# Patient Record
Sex: FEMALE | Race: BLACK OR AFRICAN AMERICAN | NOT HISPANIC OR LATINO | URBAN - METROPOLITAN AREA
[De-identification: names, ages, dates, MRNs, and addresses within clinical notes are randomized per-mention and may not be internally consistent; named-entity substitution may affect disease eponyms.]

---

## 2020-11-09 ENCOUNTER — INPATIENT (INPATIENT)
Facility: HOSPITAL | Age: 50
LOS: 2 days | Discharge: HOME | DRG: 812 | End: 2020-11-12
Attending: HOSPITALIST | Admitting: HOSPITALIST
Payer: MEDICARE

## 2020-11-09 VITALS
RESPIRATION RATE: 18 BRPM | SYSTOLIC BLOOD PRESSURE: 133 MMHG | HEART RATE: 87 BPM | HEIGHT: 60 IN | OXYGEN SATURATION: 100 % | DIASTOLIC BLOOD PRESSURE: 84 MMHG | TEMPERATURE: 98 F | WEIGHT: 123.9 LBS

## 2020-11-09 DIAGNOSIS — R63.8 OTHER SYMPTOMS AND SIGNS CONCERNING FOOD AND FLUID INTAKE: ICD-10-CM

## 2020-11-09 DIAGNOSIS — D64.9 ANEMIA, UNSPECIFIED: ICD-10-CM

## 2020-11-09 DIAGNOSIS — R79.89 OTHER SPECIFIED ABNORMAL FINDINGS OF BLOOD CHEMISTRY: ICD-10-CM

## 2020-11-09 DIAGNOSIS — Z98.891 HISTORY OF UTERINE SCAR FROM PREVIOUS SURGERY: Chronic | ICD-10-CM

## 2020-11-09 LAB
APTT BLD: 34.7 SEC — SIGNIFICANT CHANGE UP (ref 27.5–35.5)
BASOPHILS # BLD AUTO: 0.04 K/UL — SIGNIFICANT CHANGE UP (ref 0–0.2)
BASOPHILS NFR BLD AUTO: 0.7 % — SIGNIFICANT CHANGE UP (ref 0–2)
BILIRUB DIRECT SERPL-MCNC: 1 MG/DL — HIGH (ref 0–0.2)
BILIRUB INDIRECT FLD-MCNC: 0.6 MG/DL — SIGNIFICANT CHANGE UP (ref 0.2–1)
BILIRUB SERPL-MCNC: 1.7 MG/DL — HIGH (ref 0.2–1.2)
BLD GP AB SCN SERPL QL: NEGATIVE — SIGNIFICANT CHANGE UP
CK MB CFR SERPL CALC: <1 NG/ML — SIGNIFICANT CHANGE UP (ref 0–6.7)
CK SERPL-CCNC: 43 U/L — SIGNIFICANT CHANGE UP (ref 25–170)
EOSINOPHIL # BLD AUTO: 0.26 K/UL — SIGNIFICANT CHANGE UP (ref 0–0.5)
EOSINOPHIL NFR BLD AUTO: 4.5 % — SIGNIFICANT CHANGE UP (ref 0–6)
FERRITIN SERPL-MCNC: 4 NG/ML — LOW (ref 15–150)
HCT VFR BLD CALC: 23.2 % — LOW (ref 34.5–45)
HGB BLD-MCNC: 6 G/DL — CRITICAL LOW (ref 11.5–15.5)
IMM GRANULOCYTES NFR BLD AUTO: 0.3 % — SIGNIFICANT CHANGE UP (ref 0–1.5)
INR BLD: 1.02 — SIGNIFICANT CHANGE UP (ref 0.88–1.16)
IRON SATN MFR SERPL: 11 UG/DL — LOW (ref 30–160)
IRON SATN MFR SERPL: 2 % — LOW (ref 14–50)
LYMPHOCYTES # BLD AUTO: 1.34 K/UL — SIGNIFICANT CHANGE UP (ref 1–3.3)
LYMPHOCYTES # BLD AUTO: 23.3 % — SIGNIFICANT CHANGE UP (ref 13–44)
MCHC RBC-ENTMCNC: 16.7 PG — LOW (ref 27–34)
MCHC RBC-ENTMCNC: 25.9 GM/DL — LOW (ref 32–36)
MCV RBC AUTO: 64.6 FL — LOW (ref 80–100)
MONOCYTES # BLD AUTO: 0.93 K/UL — HIGH (ref 0–0.9)
MONOCYTES NFR BLD AUTO: 16.2 % — HIGH (ref 2–14)
NEUTROPHILS # BLD AUTO: 3.16 K/UL — SIGNIFICANT CHANGE UP (ref 1.8–7.4)
NEUTROPHILS NFR BLD AUTO: 55 % — SIGNIFICANT CHANGE UP (ref 43–77)
NRBC # BLD: 0 /100 WBCS — SIGNIFICANT CHANGE UP (ref 0–0)
OB PNL STL: NEGATIVE — SIGNIFICANT CHANGE UP
PLATELET # BLD AUTO: 303 K/UL — SIGNIFICANT CHANGE UP (ref 150–400)
PROTHROM AB SERPL-ACNC: 12.2 SEC — SIGNIFICANT CHANGE UP (ref 10.6–13.6)
RBC # BLD: 3.59 M/UL — LOW (ref 3.8–5.2)
RBC # FLD: 19.7 % — HIGH (ref 10.3–14.5)
RH IG SCN BLD-IMP: POSITIVE — SIGNIFICANT CHANGE UP
RH IG SCN BLD-IMP: POSITIVE — SIGNIFICANT CHANGE UP
SARS-COV-2 RNA SPEC QL NAA+PROBE: SIGNIFICANT CHANGE UP
TIBC SERPL-MCNC: 477 UG/DL — HIGH (ref 220–430)
TROPONIN T SERPL-MCNC: <0.01 NG/ML — SIGNIFICANT CHANGE UP (ref 0–0.01)
UIBC SERPL-MCNC: 466 UG/DL — HIGH (ref 110–370)
WBC # BLD: 5.75 K/UL — SIGNIFICANT CHANGE UP (ref 3.8–10.5)
WBC # FLD AUTO: 5.75 K/UL — SIGNIFICANT CHANGE UP (ref 3.8–10.5)

## 2020-11-09 PROCEDURE — 74177 CT ABD & PELVIS W/CONTRAST: CPT | Mod: 26

## 2020-11-09 PROCEDURE — 99223 1ST HOSP IP/OBS HIGH 75: CPT | Mod: GC

## 2020-11-09 PROCEDURE — 99285 EMERGENCY DEPT VISIT HI MDM: CPT

## 2020-11-09 PROCEDURE — 71045 X-RAY EXAM CHEST 1 VIEW: CPT | Mod: 26

## 2020-11-09 RX ORDER — FERROUS GLUCONATE 100 %
1 POWDER (GRAM) MISCELLANEOUS
Qty: 0 | Refills: 0 | DISCHARGE

## 2020-11-09 RX ORDER — INFLUENZA VIRUS VACCINE 15; 15; 15; 15 UG/.5ML; UG/.5ML; UG/.5ML; UG/.5ML
0.5 SUSPENSION INTRAMUSCULAR ONCE
Refills: 0 | Status: DISCONTINUED | OUTPATIENT
Start: 2020-11-09 | End: 2020-11-12

## 2020-11-09 RX ORDER — IRON SUCROSE 20 MG/ML
200 INJECTION, SOLUTION INTRAVENOUS EVERY 24 HOURS
Refills: 0 | Status: DISCONTINUED | OUTPATIENT
Start: 2020-11-09 | End: 2020-11-10

## 2020-11-09 RX ORDER — FAMOTIDINE 10 MG/ML
1 INJECTION INTRAVENOUS
Qty: 0 | Refills: 0 | DISCHARGE

## 2020-11-09 RX ORDER — IPRATROPIUM/ALBUTEROL SULFATE 18-103MCG
3 AEROSOL WITH ADAPTER (GRAM) INHALATION
Qty: 0 | Refills: 0 | DISCHARGE

## 2020-11-09 RX ORDER — PANTOPRAZOLE SODIUM 20 MG/1
40 TABLET, DELAYED RELEASE ORAL EVERY 12 HOURS
Refills: 0 | Status: DISCONTINUED | OUTPATIENT
Start: 2020-11-09 | End: 2020-11-12

## 2020-11-09 RX ORDER — FAMOTIDINE 10 MG/ML
20 INJECTION INTRAVENOUS
Refills: 0 | Status: DISCONTINUED | OUTPATIENT
Start: 2020-11-09 | End: 2020-11-09

## 2020-11-09 RX ORDER — ALBUTEROL 90 UG/1
2 AEROSOL, METERED ORAL
Qty: 0 | Refills: 0 | DISCHARGE

## 2020-11-09 RX ORDER — ALBUTEROL 90 UG/1
2 AEROSOL, METERED ORAL EVERY 6 HOURS
Refills: 0 | Status: DISCONTINUED | OUTPATIENT
Start: 2020-11-09 | End: 2020-11-12

## 2020-11-09 RX ADMIN — PANTOPRAZOLE SODIUM 40 MILLIGRAM(S): 20 TABLET, DELAYED RELEASE ORAL at 18:20

## 2020-11-09 RX ADMIN — IRON SUCROSE 110 MILLIGRAM(S): 20 INJECTION, SOLUTION INTRAVENOUS at 19:40

## 2020-11-09 NOTE — H&P ADULT - NSHPSOCIALHISTORY_GEN_ALL_CORE
formerly would smoke, smoked about a pack every few days since she was 18  formerly would drink a few beers and several shots a day for many years, now only a social drinker  formerly was a frequent user of cocaine, has not used In 2 years

## 2020-11-09 NOTE — H&P ADULT - NSHPPHYSICALEXAM_GEN_ALL_CORE
.  VITAL SIGNS:  T(C): 36.6 (11-09-20 @ 09:48), Max: 36.8 (11-09-20 @ 08:44)  T(F): 97.9 (11-09-20 @ 09:48), Max: 98.2 (11-09-20 @ 08:44)  HR: 94 (11-09-20 @ 10:54) (75 - 94)  BP: 147/79 (11-09-20 @ 10:54) (114/67 - 147/79)  BP(mean): --  RR: 16 (11-09-20 @ 09:48) (16 - 18)  SpO2: 96% (11-09-20 @ 09:48) (96% - 100%)  Wt(kg): --    PHYSICAL EXAM:    Constitutional: WDWN resting comfortably in bed; NAD  Head: NC/AT  Eyes: anicteric sclera  ENT: no nasal discharge; MMM  Neck: supple; no JVD or thyromegaly  Respiratory: CTA B/L; no W/R/R, no retractions  Cardiac: +S1/S2; RRR; no M/R/G;  Gastrointestinal: soft, NT/ND; no rebound or guarding;  Dermatologic: skin warm, dry and intact; no rashes, wounds, or scars  Neurologic: AAOx3; CNII-XII grossly intact; no focal deficits  Psychiatric: affect and characteristics of appearance, verbalizations, behaviors are appropriate

## 2020-11-09 NOTE — H&P ADULT - ATTENDING COMMENTS
50 year old woman with history of sarcoidosis with liver involvement (diagnosed in 2009, “eyes were yellow at that time”, “my liver tests were high”, treated with steroids), history of GI bleed (last EGD circa 2018, which showed gastritis and gastric ulcers, per patient’s recollection), iron deficiency anemia of unknown etiology (5 years since last period, “I was needing IV iron every other week),   Gets most of her outpatient care at [facility] (Gastroenterologist, hematologist)  Sent here from hematologist’s office for Hgb 6.0 with symptoms of dyspnea.     # Symptomatic Anemia  # Chronic iron deficiency anemia   # Acute /subacute change in Hgb  Patient gets CBC checked “Every 2 weeks”. Hgb has been >9 since last blood transfusion in Feb 2020. Last known Hgb was 9.5 on October 15th 2020 (~ 3 weeks ago)  Given drop of 3.5 points in Hgb over 3 weeks (her Hgb had previously been stable for >6months) and no other source for Hgb drop. Suspect GI bleed.   -	PPI BID; Consult GI   -	f/u post-transfusion CBC  -	Iron studies consistent with iron deficiency;   -	 IV iron  -	Consult Dr. Hester (from [facility] where patient gets her outpatient care)  [ ] Please get records from [facility]: her past labs (CMP, CBCs), clinic notes, information on past work-up.     # Alk phos elevation.   # Protein-albumin gap   Has history of sarcoidosis with liver involvement, which would explain elevated alk phos and bili. May also explain gammopathy. Has hematologist and GI outpatient, however, chronicity of lab abnormalities is unclear and it is unclear to what extent her lab abnormalities have been worked up   [ ] Get records from outpatient provider

## 2020-11-09 NOTE — H&P ADULT - PROBLEM SELECTOR PLAN 2
Patient presents with anemia, unintentional weight loss, constipation, decreased appetite, as well as history of multiple different family cancers. Patient up to date on health care maintenance  -follow up CT/AP

## 2020-11-09 NOTE — H&P ADULT - HISTORY OF PRESENT ILLNESS
49 y/o F w/ PMHx gastritis, gastric ulcers, asthma, chronic anemia, sickle cell trait    ED Course: Temp 97.9, HR 94, /79, RR 16, saturating 96% on RA. Hemoglobin 6.0, WBC 5.75, platelets 303; MCV 64.6, ferritin 4, serum iron 11, TIBC 466; alk phos 724, bilirubin 1.7, AST/ALT 57/40; ordered for 1 unit PRBC 51 y/o F w/ PMHx gastritis, gastric ulcers, asthma, chronic anemia, sickle cell trait, sarcoid, presenting with after being found anemic at an outpatient appointment. Patient reports a 2 day history of shortness of breath on exertion as well as numbness in her fingers and left leg extending from her foot to her knee. She endorses 2 falls in the past weeks, the first of which occurred while she was getting out of bed. She states at that time she felt she got up too quickly and became lightheaded and fell. Denies any head trauma, palpitations, or LOC. The second fall occurred when she was getting off the couch and felt like her leg felt went numb and gave out on her. Denies any LOC, but endorses feeling her heart rate increase. She has had the numbness and tingling in her left leg for about 3 weeks. Patient reports 1 black formed stool 2 days prior to admission. Denies any hemoptysis, hematemesis or vaginal bleeding.     Patient has a history of bleeding disorder and typically requires a blood transfusion every 3-4x month, however while she was menstruating she would sometimes require them 1-2x a month. Her most recent transfusion was in February 2020. At that time she reports passing out on the street and waking up in the hospital. She is supposed to receive an iron transfusion every week, however she last received one in April.    2 years ago was admitted for hematemesis, at that time she had an endoscopy performed was given a transfusion, and diagnosed with gastric ulcers.     In 2009 she was admitted to the hospital for a 130lb weight loss of 1 months time. Patient notes that at that time she was drinking heavily and doing a lot of cocaine. She was treated with steroids and a pill to improve her appetite. She has since stopped taking that pill and has started smoking 1 joint of marijuana daily in its place.    ED Course: Temp 97.9, HR 94, /79, RR 16, saturating 96% on RA. Hemoglobin 6.0, WBC 5.75, platelets 303; MCV 64.6, ferritin 4, serum iron 11, TIBC 466; alk phos 724, bilirubin 1.7, AST/ALT 57/40; ordered for 1 unit PRBC INCOMPLETE!!    49 y/o F w/ PMHx gastritis, gastric ulcers, asthma, chronic anemia, sickle cell trait, sarcoid, presenting with after being found anemic at an outpatient appointment. Patient reports a 2 day history of shortness of breath on exertion as well as numbness in her fingers and left leg extending from her foot to her knee. She endorses 2 falls in the past weeks, the first of which occurred while she was getting out of bed. She states at that time she felt she got up too quickly and became lightheaded and fell. Denies any head trauma, palpitations, or LOC. The second fall occurred when she was getting off the couch and felt like her leg felt went numb and gave out on her. Denies any LOC, but endorses feeling her heart rate increase. She has had the numbness and tingling in her left leg for about 3 weeks. Patient reports 1 black formed stool 2 days prior to admission. Denies any hemoptysis, hematemesis or vaginal bleeding.     Patient has a history of bleeding disorder and typically requires a blood transfusion every 3-4x month, however while she was menstruating she would sometimes require them 1-2x a month. Her most recent transfusion was in February 2020. At that time she reports passing out on the street and waking up in the hospital. She is supposed to receive an iron transfusion every week, however she last received one in April. 2 years ago was admitted for hematemesis, at that time she had an endoscopy performed, was given a transfusion, and diagnosed with an ulcer.    In 2009 she was diagnosed with sarcoid after being admitted to the hospital for a 130lb weight loss of 1 months time. Patient notes that at that time she was drinking heavily and doing a lot of cocaine. She was treated with steroids and a pill to improve her appetite. She has since stopped taking that pill and has started smoking 1 joint of marijuana daily in its place.    ED Course: Temp 97.9, HR 94, /79, RR 16, saturating 96% on RA. Hemoglobin 6.0, WBC 5.75, platelets 303; MCV 64.6, ferritin 4, serum iron 11, TIBC 466; alk phos 724, bilirubin 1.7, AST/ALT 57/40; ordered for 1 unit PRBC 51 y/o F w/ PMHx gastritis, gastric ulcers, asthma, chronic anemia, sickle cell trait, sarcoid, presenting with after being found anemic at an outpatient appointment. Patient reports a 2 day history of shortness of breath on exertion as well as left leg extending from her foot to her knee. She endorses 2 falls in the past weeks, the first of which occurred while she was getting out of bed. She states at that time she felt she got up too quickly and became lightheaded and fell. Denies any head trauma, palpitations, or LOC. The second fall occurred when she was getting off the couch and felt like her leg felt went numb and gave out on her. Denies any LOC, but endorses feeling her heart rate increase. She has had the numbness and tingling in her left leg for about 3 weeks. Patient reports 1 black formed stool 2 days prior to admission. Denies any hemoptysis, hematemesis or vaginal bleeding.    Patient has a history of bleeding disorder and typically requires a blood transfusion every 3-4x month, however while she was menstruating she would sometimes require them 1-2x a month. Her most recent transfusion was in February 2020. At that time she reports passing out on the street and waking up in the hospital. She is supposed to receive an iron transfusion every week, however she last received one in April. 2 years ago was admitted for hematemesis, at that time she had an endoscopy performed, was given a transfusion, and diagnosed with an ulcer. Most recent CBC in mid-October showed hemoglobin around 9.5. Patient has previously had extensive workup done for anemia and has never been given a diagnosis. Of note, her mother had an unknown bleeding disorder as well.    In 2009 she was diagnosed with sarcoid after being admitted to the hospital for a 130lb weight loss of 1 months time. Patient notes that at that time she was drinking heavily and doing a lot of cocaine. She was treated with steroids and a pill to improve her appetite. At that time she reports having scleral icterus and elevated LFTs, which have remained high. She has since stopped taking that pill and has started smoking 1 joint of marijuana daily in its place.    ED Course: Temp 97.9, HR 94, /79, RR 16, saturating 96% on RA. Hemoglobin 6.0, WBC 5.75, platelets 303; MCV 64.6, ferritin 4, serum iron 11, TIBC 466; alk phos 724, bilirubin 1.7, AST/ALT 57/40; ordered for 1 unit PRBC

## 2020-11-09 NOTE — H&P ADULT - PROBLEM SELECTOR PLAN 3
Patient presents with elevated a phos, ggt, as bilirubin with direct bili predominance, indicating a cholestatic picture. DDx for intrahepatic can include sarcoidosis given patient history. Hepatic malignancy must be considered as well given history of extensive alcohol use. Metastatic malignancy as well as malignancy resulting in extrahepatic cholestasis must also be ruled out given constitutional symptoms the patient has been experiencing. Obstruction can also be a cause of elevated alk phos given discomfort during exam, although less likely given pawel tenderness as well as lack of history of abdominal pain.  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 However given constitutional symptoms, workup for monoclonal gammopathy must be worked up with SPEP/MGUS to assess for additional malignancy  -follow up free light chains  -follow up spep

## 2020-11-09 NOTE — ED PROVIDER NOTE - ATTENDING CONTRIBUTION TO CARE
as per HPI. Vitals wnl, exam as above. Well appearing.  In ED: Anemic. mild transaminitis. Other labs grossly wnl.   ddx: Symptomatic anemia. Likely related to iron/sickle trait.   PRBC, will admit for further care.

## 2020-11-09 NOTE — H&P ADULT - PROBLEM SELECTOR PLAN 1
Patient presents with a hemoglobin of 6.0, dyspnea on exertion x2 days, and 1 episode of black formed stool, with iron studies, MCV, and reticulocyte count indicating a hypoproliferative microcytic iron deficiency anemia. Cause likely due to non-adherence to iron infusions, perhaps compounded by sickle cell trait. Singular episode of black stool likely related iron supplements. Denies any vaginal bleeding. Patient has hx of ulcer however unlikely to be actively bleeding given negative FOBT, lack of hematemesis, melena, or hematochezia.  -hemoglobin 6, MCV 64.4, ferritin 4, TIBC 477, transferrin 459  -reticulocyte index 0.44, indicating hypoproliferative cause   -GI consulted for possible bleed given drop of hemoglobin from 9.5 to 6 in <1 month  -consider hem/onc consult given hypoproliferative iron deficiency anemia  -continue protonix 40mg IV BID  -patient to receive venofer 200mg x2 q24 hours  -s/p 1 unit PRBC in ED  -follow up B12, folate  -maintain active type and screen

## 2020-11-09 NOTE — H&P ADULT - NSICDXFAMILYHX_GEN_ALL_CORE_FT
FAMILY HISTORY:  FH: breast cancer  FH: hypertension  FH: kidney cancer  FHx: diabetes mellitus  FHx: lung cancer

## 2020-11-09 NOTE — H&P ADULT - NSHPREVIEWOFSYSTEMS_GEN_ALL_CORE
stabbing pain under the left breast, going on for about a year, worsening for the past 6 months  constipation  weight loss, 30 pounds in 1 month  decreased appetite

## 2020-11-09 NOTE — H&P ADULT - ASSESSMENT
Patient is a 50 year old female with pmhx of anemia requiring frequent transfusions sarcoid, gastritis, gastric ulcers, asthma, chronic anemia, sickle cell trait, presenting with dyspnea on exertion, 30 pound weight loss, constipation, found to be hemodynamically stable with a hemoglobin of 6.0, bilirubin of 1.7, and alk phos of 724, admitted for treatment of symptomatic anemia    #anemia  Patient presents with a hemoglobin of 6.0, dyspnea on exertion x2 days, and 1 episode of black formed stool. Patient has a hx of receiving a transfusion about every 3-4 months. Patient typically receives an iron infusion every week however has not received in >6 months, likely contributing to current anemia. Iron studies and MCV indicate a microcytic iron deficiency anemia. Cause likely due to chronic condition worsened by non-adherence to iron infusions, perhaps compounded by sickle cell trait. Singular episode of black stool likely related iron supplements. Denies any vaginal bleeding. Patient has hx of ulcer however unlikely to be actively bleeding given negative FOBT, lack of hematemesis, melena, or hematochezia.  -s/p 1 unit in ED  -f/u post transfusion cbc  -maintain active type and screen    #rule out malignancy  Patient presents with anemia, unintentional weight loss, constipation, decreased appetite, as well as history of multiple different family cancers. Patient uptodate on health care maintenance  -follow up CT/AP    #Elevated LFTs  Patient presents with elevated a phos, ggt, as bilirubin with direct bili predominance, indicating a cholestatic picture. Follow up CT/AP to further assess for intrahepatic vs extrahepatic cholestasis. DDx for intrahepatic can include sarcoidosis given patient history. Hepatic malignancy must be considered as well given history of extensive alcohol use. Metastatic malignancy as well as malignancy resulting in extrahepatic cholestasis must also be ruled out given constitutional symptoms the patient has been experiencing. Obstruction can also be a cause of elevated alk phos given discomfort during exam, although less likely given pawel tenderness as well as lack of history of abdominal pain. Patient also has elevated AST/ALT ratio of 1.4, likely indicating alcoholic liver disease  -follow up CT/AP    #nutrition, metabolism  E: replete as needed  F: s/p 1 unit prbc  D: regular diet  DVT ppx: hold given low hemoglobin   Patient is a 50 year old female with pmhx of anemia requiring frequent transfusions sarcoid, gastritis, gastric ulcers, asthma, chronic anemia, sickle cell trait, presenting with dyspnea on exertion, 30 pound weight loss, constipation, found to be hemodynamically stable with a hemoglobin of 6.0, bilirubin of 1.7, and alk phos of 724, admitted for treatment of symptomatic anemia    #anemia  Patient presents with a hemoglobin of 6.0, dyspnea on exertion x2 days, and 1 episode of black formed stool. Patient has a hx of receiving a transfusion about every 3-4 months. Patient typically receives an iron infusion every week however has not received in >6 months, likely contributing to current anemia. Iron studies and MCV indicate a microcytic iron deficiency anemia. Cause likely due to chronic condition worsened by non-adherence to iron infusions, perhaps compounded by sickle cell trait. Singular episode of black stool likely related iron supplements. Denies any vaginal bleeding. Patient has hx of ulcer however unlikely to be actively bleeding given negative FOBT, lack of hematemesis, melena, or hematochezia.  -s/p 1 unit in ED  -f/u post transfusion cbc  -maintain active type and screen    #rule out malignancy  Patient presents with anemia, unintentional weight loss, constipation, decreased appetite, as well as history of multiple different family cancers. Patient uptodate on health care maintenance  -follow up CT/AP    #Elevated LFTs  Patient presents with elevated a phos, ggt, as bilirubin with direct bili predominance, indicating a cholestatic picture. Follow up CT/AP to further assess for intrahepatic vs extrahepatic cholestasis. DDx for intrahepatic can include sarcoidosis given patient history. Hepatic malignancy must be considered as well given history of extensive alcohol use. Metastatic malignancy as well as malignancy resulting in extrahepatic cholestasis must also be ruled out given constitutional symptoms the patient has been experiencing. Obstruction can also be a cause of elevated alk phos given discomfort during exam, although less likely given pawel tenderness as well as lack of history of abdominal pain. 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 However given constitutional symptoms, if workup described above for malignancy does not yield any results, workup for monoclonal gammopathy must be worked up with SPEP/MGUS to assess for additional malignancy.     #nutrition, metabolism  E: replete as needed  F: s/p 1 unit prbc  D: regular diet  DVT ppx: hold given low hemoglobin   Patient is a 50 year old female with pmhx of anemia requiring frequent transfusions sarcoid, gastritis, gastric ulcers, asthma, chronic anemia, sickle cell trait, presenting with dyspnea on exertion, 30 pound weight loss, constipation, found to be hemodynamically stable with a hemoglobin of 6.0, bilirubin of 1.7, and alk phos of 724, admitted for treatment of symptomatic anemia    #anemia  Patient presents with a hemoglobin of 6.0, dyspnea on exertion x2 days, and 1 episode of black formed stool, with iron studies, MCV, and reticulocyte count indicating a hypoproliferative microcytic iron deficiency anemia. Cause likely due to non-adherence to iron infusions, perhaps compounded by sickle cell trait. Singular episode of black stool likely related iron supplements. Denies any vaginal bleeding. Patient has hx of ulcer however unlikely to be actively bleeding given negative FOBT, lack of hematemesis, melena, or hematochezia.  -hemoglobin 6, MCV 64.4, ferritin 4, TIBC 477, transferrin 459  -reticulocyte index 0.44, indicating hypoproliferative cause   -GI consulted for possible bleed given drop of hemoglobin from 9.5 to 6 in <1 month  -consider hem/onc consult given hypoproliferative iron deficiency anemia  -continue protonix 40mg IV BID  -patient to receive IV iron transfusion  -s/p 1 unit PRBC in ED  -follow up B12  -maintain active type and screen    #rule out malignancy  Patient presents with anemia, unintentional weight loss, constipation, decreased appetite, as well as history of multiple different family cancers. Patient up to date on health care maintenance  -follow up CT/AP    #Elevated LFTs  Patient presents with elevated a phos, ggt, as bilirubin with direct bili predominance, indicating a cholestatic picture. DDx for intrahepatic can include sarcoidosis given patient history. Hepatic malignancy must be considered as well given history of extensive alcohol use. Metastatic malignancy as well as malignancy resulting in extrahepatic cholestasis must also be ruled out given constitutional symptoms the patient has been experiencing. Obstruction can also be a cause of elevated alk phos given discomfort during exam, although less likely given pawel tenderness as well as lack of history of abdominal pain.  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 However given constitutional symptoms, workup for monoclonal gammopathy must be worked up with SPEP/MGUS to assess for additional malignancy  -follow up free light chains  -follow up spep    #nutrition, metabolism  E: replete as needed  F: s/p 1 unit prbc  D: regular diet  DVT ppx: hold given low hemoglobin   Patient is a 50 year old female with pmhx of anemia requiring frequent transfusions sarcoid, gastritis, gastric ulcers, asthma, chronic anemia, sickle cell trait, presenting with dyspnea on exertion, 30 pound weight loss, constipation, found to be hemodynamically stable with a hemoglobin of 6.0, bilirubin of 1.7, and alk phos of 724, admitted for treatment of symptomatic anemia    #anemia  Patient presents with a hemoglobin of 6.0, dyspnea on exertion x2 days, and 1 episode of black formed stool, with iron studies, MCV, and reticulocyte count indicating a hypoproliferative microcytic iron deficiency anemia. Cause likely due to non-adherence to iron infusions, perhaps compounded by sickle cell trait. Singular episode of black stool likely related iron supplements. Denies any vaginal bleeding. Patient has hx of ulcer however unlikely to be actively bleeding given negative FOBT, lack of hematemesis, melena, or hematochezia.  -hemoglobin 6, MCV 64.4, ferritin 4, TIBC 477, transferrin 459  -reticulocyte index 0.44, indicating hypoproliferative cause   -GI consulted for possible bleed given drop of hemoglobin from 9.5 to 6 in <1 month  -consider hem/onc consult given hypoproliferative iron deficiency anemia  -continue protonix 40mg IV BID  -patient to receive venofer 200mg x2 q24 hours  -s/p 1 unit PRBC in ED  -follow up B12, folate  -maintain active type and screen    #rule out malignancy  Patient presents with anemia, unintentional weight loss, constipation, decreased appetite, as well as history of multiple different family cancers. Patient up to date on health care maintenance  -follow up CT/AP    #Elevated LFTs  Patient presents with elevated a phos, ggt, as bilirubin with direct bili predominance, indicating a cholestatic picture. DDx for intrahepatic can include sarcoidosis given patient history. Hepatic malignancy must be considered as well given history of extensive alcohol use. Metastatic malignancy as well as malignancy resulting in extrahepatic cholestasis must also be ruled out given constitutional symptoms the patient has been experiencing. Obstruction can also be a cause of elevated alk phos given discomfort during exam, although less likely given pawel tenderness as well as lack of history of abdominal pain.  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 However given constitutional symptoms, workup for monoclonal gammopathy must be worked up with SPEP/MGUS to assess for additional malignancy  -follow up free light chains  -follow up spep    #nutrition, metabolism  E: replete as needed  F: s/p 1 unit prbc  D: regular diet  DVT ppx: hold given low hemoglobin   Patient is a 50 year old female with pmhx of anemia requiring frequent transfusions sarcoid, gastritis, gastric ulcers, asthma, chronic anemia, sickle cell trait, presenting with dyspnea on exertion, 30 pound weight loss, constipation, found to be hemodynamically stable with a hemoglobin of 6.0, bilirubin of 1.7, and alk phos of 724, admitted for treatment of symptomatic anemia.

## 2020-11-09 NOTE — ED ADULT TRIAGE NOTE - CHIEF COMPLAINT QUOTE
pt arriving to ED for c/c "my doctor sent me because my hemoglobin is low". pt also c/o BARRIENTOS, and bilateral upper and lower extremity "tingling" which has persisted x 1 month. hx sarcoidosis, asthma, anemia.

## 2020-11-09 NOTE — ED ADULT NURSE NOTE - OBJECTIVE STATEMENT
Pt presents to ER with c/o "low hemoglobin" from PMD office. Pt reports lethargy and SOB for past month, associated with tingling in fingers. Pt reports hx anemia requiring blood transfusions (last in February). Also reports hx sickle cell trait and asthma.

## 2020-11-09 NOTE — ED PROVIDER NOTE - OBJECTIVE STATEMENT
49 y/o F w/ PMHx gastritis, gastric ulcers, asthma, chronic anemia for which she has iron infusions on a regular basis, now presents to the ED reporting wk and half ago had low hgb levels since c/o some tingling to her extremities, dyspnea w/ exertion, feeling fatigue and malaise. Reports that had last iron infusion 3 months aog and was due for one last month but kept cancelling her appt. Admits to dark stools, non bloody. Denies dizziness, abd pain, n/v, urinary sx or any other acute sx. Takes iron supplements x3 per day on daily basis. No blood thinners. Last echo yr ago which per pt normal. Follows w/ doctors from [facility] in [place]. 49 y/o F w/ PMHx gastritis, gastric ulcers, asthma, chronic anemia for which she has iron infusions on a regular basis. Now presents to the ED reporting a wk and half ago of having low hgb levels.  Since then she has been having  some tingling to her extremities, dyspnea w/ exertion, feeling fatigue and malaise. Reports that had last iron infusion 3 months ago and was due for one last month but kept cancelling her appt. Admits to dark stools, non bloody. Denies dizziness, abd pain, n/v, urinary sx or any other acute sx. Takes iron supplements x3 per day on daily basis. No blood thinners. Last echo yr ago which per pt normal. Follows w/ doctors from [facility]  in [place].

## 2020-11-09 NOTE — H&P ADULT - NSHPLABSRESULTS_GEN_ALL_CORE
.  LABS:                         6.0    5.75  )-----------( 303      ( 09 Nov 2020 09:18 )             23.2     11-09    137  |  105  |  15  ----------------------------<  97  4.2   |  22  |  1.00    Ca    9.0      09 Nov 2020 09:15    TPro  7.8  /  Alb  3.3  /  TBili  1.7<H>  /  DBili  x   /  AST  57<H>  /  ALT  40  /  AlkPhos  724<H>  11-09    PT/INR - ( 09 Nov 2020 09:15 )   PT: 12.2 sec;   INR: 1.02          PTT - ( 09 Nov 2020 09:15 )  PTT:34.7 sec    CARDIAC MARKERS ( 09 Nov 2020 09:15 )  x     / <0.01 ng/mL / 43 U/L / x     / <1.0 ng/mL            RADIOLOGY, EKG & ADDITIONAL TESTS: Reviewed. Pulses equal bilaterally, no edema present. .  LABS:                         6.0    5.75  )-----------( 303      ( 09 Nov 2020 09:18 )             23.2     11-09    137  |  105  |  15  ----------------------------<  97  4.2   |  22  |  1.00    Ca    9.0      09 Nov 2020 09:15    TPro  7.8  /  Alb  3.3  /  TBili  1.7<H>  /  DBili  x   /  AST  57<H>  /  ALT  40  /  AlkPhos  724<H>  11-09    PT/INR - ( 09 Nov 2020 09:15 )   PT: 12.2 sec;   INR: 1.02          PTT - ( 09 Nov 2020 09:15 )  PTT:34.7 sec    CARDIAC MARKERS ( 09 Nov 2020 09:15 )  x     / <0.01 ng/mL / 43 U/L / x     / <1.0 ng/mL    RADIOLOGY, EKG & ADDITIONAL TESTS: Reviewed.

## 2020-11-09 NOTE — ED PROVIDER NOTE - CLINICAL SUMMARY MEDICAL DECISION MAKING FREE TEXT BOX
Patient well appearing, NAD and vss. Not orthostatics with normal ekg. Blood Patient well appearing, NAD and vss. Patient is not orthostatics with normal ekg. Blood transfusion was ordered. Admitted for symptomatic anemia.

## 2020-11-09 NOTE — H&P ADULT - NSICDXPASTMEDICALHX_GEN_ALL_CORE_FT
PAST MEDICAL HISTORY:  Acid reflux disease with ulcer     Anemia     Asthma     Chronic GERD     Sarcoid     Sickle-cell trait

## 2020-11-10 DIAGNOSIS — J45.909 UNSPECIFIED ASTHMA, UNCOMPLICATED: ICD-10-CM

## 2020-11-10 DIAGNOSIS — D86.9 SARCOIDOSIS, UNSPECIFIED: ICD-10-CM

## 2020-11-10 DIAGNOSIS — D57.3 SICKLE-CELL TRAIT: ICD-10-CM

## 2020-11-10 LAB
ALBUMIN SERPL ELPH-MCNC: 3.2 G/DL — LOW (ref 3.3–5)
ALP SERPL-CCNC: 650 U/L — HIGH (ref 40–120)
ALT FLD-CCNC: 36 U/L — SIGNIFICANT CHANGE UP (ref 10–45)
ANION GAP SERPL CALC-SCNC: 11 MMOL/L — SIGNIFICANT CHANGE UP (ref 5–17)
AST SERPL-CCNC: 49 U/L — HIGH (ref 10–40)
BASOPHILS # BLD AUTO: 0.04 K/UL — SIGNIFICANT CHANGE UP (ref 0–0.2)
BASOPHILS NFR BLD AUTO: 0.7 % — SIGNIFICANT CHANGE UP (ref 0–2)
BILIRUB SERPL-MCNC: 1.6 MG/DL — HIGH (ref 0.2–1.2)
BUN SERPL-MCNC: 11 MG/DL — SIGNIFICANT CHANGE UP (ref 7–23)
CALCIUM SERPL-MCNC: 8.9 MG/DL — SIGNIFICANT CHANGE UP (ref 8.4–10.5)
CHLORIDE SERPL-SCNC: 103 MMOL/L — SIGNIFICANT CHANGE UP (ref 96–108)
CO2 SERPL-SCNC: 22 MMOL/L — SIGNIFICANT CHANGE UP (ref 22–31)
CREAT SERPL-MCNC: 0.86 MG/DL — SIGNIFICANT CHANGE UP (ref 0.5–1.3)
EOSINOPHIL # BLD AUTO: 0.22 K/UL — SIGNIFICANT CHANGE UP (ref 0–0.5)
EOSINOPHIL NFR BLD AUTO: 4 % — SIGNIFICANT CHANGE UP (ref 0–6)
FOLATE SERPL-MCNC: 6.1 NG/ML — SIGNIFICANT CHANGE UP
GLUCOSE SERPL-MCNC: 90 MG/DL — SIGNIFICANT CHANGE UP (ref 70–99)
HAPTOGLOB SERPL-MCNC: 29 MG/DL — LOW (ref 34–200)
HAV IGM SER-ACNC: SIGNIFICANT CHANGE UP
HBV CORE AB SER-ACNC: SIGNIFICANT CHANGE UP
HBV CORE IGM SER-ACNC: SIGNIFICANT CHANGE UP
HBV SURFACE AG SER-ACNC: SIGNIFICANT CHANGE UP
HCT VFR BLD CALC: 23.8 % — LOW (ref 34.5–45)
HCT VFR BLD CALC: 29.4 % — LOW (ref 34.5–45)
HCV AB S/CO SERPL IA: 0.91 S/CO — SIGNIFICANT CHANGE UP
HCV AB SERPL-IMP: SIGNIFICANT CHANGE UP
HGB BLD-MCNC: 6.7 G/DL — CRITICAL LOW (ref 11.5–15.5)
HGB BLD-MCNC: 8.6 G/DL — LOW (ref 11.5–15.5)
IMM GRANULOCYTES NFR BLD AUTO: 1.3 % — SIGNIFICANT CHANGE UP (ref 0–1.5)
KAPPA LC SER QL IFE: 5.38 MG/DL — HIGH (ref 0.33–1.94)
KAPPA/LAMBDA FREE LIGHT CHAIN RATIO, SERUM: 1.76 RATIO — HIGH (ref 0.26–1.65)
LAMBDA LC SER QL IFE: 3.06 MG/DL — HIGH (ref 0.57–2.63)
LDH SERPL L TO P-CCNC: 242 U/L — SIGNIFICANT CHANGE UP (ref 50–242)
LYMPHOCYTES # BLD AUTO: 1.12 K/UL — SIGNIFICANT CHANGE UP (ref 1–3.3)
LYMPHOCYTES # BLD AUTO: 20.5 % — SIGNIFICANT CHANGE UP (ref 13–44)
MAGNESIUM SERPL-MCNC: 2 MG/DL — SIGNIFICANT CHANGE UP (ref 1.6–2.6)
MCHC RBC-ENTMCNC: 18.8 PG — LOW (ref 27–34)
MCHC RBC-ENTMCNC: 20.5 PG — LOW (ref 27–34)
MCHC RBC-ENTMCNC: 28.2 GM/DL — LOW (ref 32–36)
MCHC RBC-ENTMCNC: 29.3 GM/DL — LOW (ref 32–36)
MCV RBC AUTO: 66.7 FL — LOW (ref 80–100)
MCV RBC AUTO: 70 FL — LOW (ref 80–100)
MONOCYTES # BLD AUTO: 0.88 K/UL — SIGNIFICANT CHANGE UP (ref 0–0.9)
MONOCYTES NFR BLD AUTO: 16.1 % — HIGH (ref 2–14)
NEUTROPHILS # BLD AUTO: 3.14 K/UL — SIGNIFICANT CHANGE UP (ref 1.8–7.4)
NEUTROPHILS NFR BLD AUTO: 57.4 % — SIGNIFICANT CHANGE UP (ref 43–77)
NRBC # BLD: 0 /100 WBCS — SIGNIFICANT CHANGE UP (ref 0–0)
NRBC # BLD: 0 /100 WBCS — SIGNIFICANT CHANGE UP (ref 0–0)
PHOSPHATE SERPL-MCNC: 2.5 MG/DL — SIGNIFICANT CHANGE UP (ref 2.5–4.5)
PLATELET # BLD AUTO: 237 K/UL — SIGNIFICANT CHANGE UP (ref 150–400)
PLATELET # BLD AUTO: 239 K/UL — SIGNIFICANT CHANGE UP (ref 150–400)
POTASSIUM SERPL-MCNC: 3.8 MMOL/L — SIGNIFICANT CHANGE UP (ref 3.5–5.3)
POTASSIUM SERPL-SCNC: 3.8 MMOL/L — SIGNIFICANT CHANGE UP (ref 3.5–5.3)
PROT SERPL-MCNC: 6.4 G/DL — SIGNIFICANT CHANGE UP (ref 6–8.3)
PROT SERPL-MCNC: 6.4 G/DL — SIGNIFICANT CHANGE UP (ref 6–8.3)
PROT SERPL-MCNC: 6.8 G/DL — SIGNIFICANT CHANGE UP (ref 6–8.3)
RBC # BLD: 3.57 M/UL — LOW (ref 3.8–5.2)
RBC # BLD: 4.2 M/UL — SIGNIFICANT CHANGE UP (ref 3.8–5.2)
RBC # FLD: 23.4 % — HIGH (ref 10.3–14.5)
RBC # FLD: 25.4 % — HIGH (ref 10.3–14.5)
SODIUM SERPL-SCNC: 136 MMOL/L — SIGNIFICANT CHANGE UP (ref 135–145)
VIT B12 SERPL-MCNC: 714 PG/ML — SIGNIFICANT CHANGE UP (ref 232–1245)
WBC # BLD: 5.47 K/UL — SIGNIFICANT CHANGE UP (ref 3.8–10.5)
WBC # BLD: 7.18 K/UL — SIGNIFICANT CHANGE UP (ref 3.8–10.5)
WBC # FLD AUTO: 5.47 K/UL — SIGNIFICANT CHANGE UP (ref 3.8–10.5)
WBC # FLD AUTO: 7.18 K/UL — SIGNIFICANT CHANGE UP (ref 3.8–10.5)

## 2020-11-10 PROCEDURE — 74183 MRI ABD W/O CNTR FLWD CNTR: CPT | Mod: 26

## 2020-11-10 PROCEDURE — 99223 1ST HOSP IP/OBS HIGH 75: CPT

## 2020-11-10 PROCEDURE — 99222 1ST HOSP IP/OBS MODERATE 55: CPT

## 2020-11-10 PROCEDURE — 99233 SBSQ HOSP IP/OBS HIGH 50: CPT

## 2020-11-10 PROCEDURE — 71250 CT THORAX DX C-: CPT | Mod: 26

## 2020-11-10 RX ORDER — DIPHENHYDRAMINE HCL 50 MG
25 CAPSULE ORAL ONCE
Refills: 0 | Status: COMPLETED | OUTPATIENT
Start: 2020-11-10 | End: 2020-11-10

## 2020-11-10 RX ORDER — IRON SUCROSE 20 MG/ML
200 INJECTION, SOLUTION INTRAVENOUS EVERY 24 HOURS
Refills: 0 | Status: DISCONTINUED | OUTPATIENT
Start: 2020-11-10 | End: 2020-11-12

## 2020-11-10 RX ADMIN — ALBUTEROL 2 PUFF(S): 90 AEROSOL, METERED ORAL at 13:22

## 2020-11-10 RX ADMIN — PANTOPRAZOLE SODIUM 40 MILLIGRAM(S): 20 TABLET, DELAYED RELEASE ORAL at 18:30

## 2020-11-10 RX ADMIN — PANTOPRAZOLE SODIUM 40 MILLIGRAM(S): 20 TABLET, DELAYED RELEASE ORAL at 05:18

## 2020-11-10 RX ADMIN — Medication 25 MILLIGRAM(S): at 23:32

## 2020-11-10 RX ADMIN — IRON SUCROSE 110 MILLIGRAM(S): 20 INJECTION, SOLUTION INTRAVENOUS at 18:30

## 2020-11-10 NOTE — PROGRESS NOTE ADULT - SUBJECTIVE AND OBJECTIVE BOX
Hospital Course:   The patient was admitted overnight on 11/9 and found to be anemic to 6.0. She was transfused with 1u PRBC and admitted to a [facility]. Repeat CBC showed a Hgb of 6.7 and she received another unit of PRBC. Hospital Course:   The patient was admitted overnight on 11/9 and found to be anemic to 6.0. She was transfused with 1u PRBC and admitted to a [facility]. Repeat CBC showed a Hgb of 6.7 and she received another unit of PRBC.     OVERNIGHT EVENTS: No acute events since arrival to [facility].     SUBJECTIVE / INTERVAL HPI: Patient seen and examined at bedside. She reports that she feels much better after first transfusion with no acute complaints at this time.     VITAL SIGNS:  Vital Signs Last 24 Hrs  T(C): 36.9 (10 Nov 2020 12:05), Max: 37.2 (10 Nov 2020 05:47)  T(F): 98.5 (10 Nov 2020 12:05), Max: 98.9 (10 Nov 2020 05:47)  HR: 67 (10 Nov 2020 12:05) (67 - 75)  BP: 136/84 (10 Nov 2020 12:05) (101/65 - 136/84)  RR: 17 (10 Nov 2020 12:05) (17 - 18)  SpO2: 99% (10 Nov 2020 12:05) (99% - 99%)    PHYSICAL EXAM:    Constitutional: WDWN resting comfortably in bed; NAD  Head: NC/AT  Eyes: anicteric sclera  ENT: no nasal discharge; MMM  Neck: supple; no JVD or thyromegaly  Respiratory: CTA B/L; no W/R/R, no retractions  Cardiac: +S1/S2; RRR; no M/R/G;  Gastrointestinal: soft, NT/ND; no rebound or guarding;  Dermatologic: skin warm, dry and intact; no rashes, wounds, or scars  Neurologic: AAOx3; CNII-XII grossly intact; no focal deficits  Psychiatric: affect and characteristics of appearance, verbalizations, behaviors are appropriate    MEDICATIONS:  MEDICATIONS  (STANDING):  influenza   Vaccine 0.5 milliLiter(s) IntraMuscular once  iron sucrose IVPB 200 milliGRAM(s) IV Intermittent every 24 hours  pantoprazole  Injectable 40 milliGRAM(s) IV Push every 12 hours    MEDICATIONS  (PRN):  ALBUTerol    90 MICROgram(s) HFA Inhaler 2 Puff(s) Inhalation every 6 hours PRN Shortness of Breath and/or Wheezing      ALLERGIES:  Allergies    penicillin (Anaphylaxis)  Tomatoes (Pruritus)    Intolerances        LABS:                        6.7    5.47  )-----------( 239      ( 10 Nov 2020 07:51 )             23.8     11-10    136  |  103  |  11  ----------------------------<  90  3.8   |  22  |  0.86    Ca    8.9      10 Nov 2020 07:51  Phos  2.5     11-10  Mg     2.0     11-10    TPro  6.4  /  Alb  x   /  TBili  x   /  DBili  x   /  AST  x   /  ALT  x   /  AlkPhos  x   11-10    PT/INR - ( 09 Nov 2020 09:15 )   PT: 12.2 sec;   INR: 1.02          PTT - ( 09 Nov 2020 09:15 )  PTT:34.7 sec    CAPILLARY BLOOD GLUCOSE          RADIOLOGY & ADDITIONAL TESTS: Reviewed.

## 2020-11-10 NOTE — CONSULT NOTE ADULT - ASSESSMENT
50 year old female presents with anemia.   Patient has a history of sarcoidosis, diagnosed in 50 year old female presents with anemia.   Patient has a history of sarcoidosis.   Given her history of abdominal sarcoid, suspect that this could be active again. She is off steroids. She further has what appears to be a resolving EN rash.   Also with history of pulmonary sarcoidosis and now with SOB and a cough. This needs further evaluation.   Anemia not likely related to sarcoidosis as this is longstanding from a bleeding disorder, and sarcoidosis can cause anemia but most commonly from marrow infiltration.     Check the following:  - ACE, 1,25OHD, 25OHD, ionized calcium, quantitative immunoglobulins  - High resolution chest CT to evaluate for pulmonary sarcoidosis   - Consider steroids for treatment of liver disease though will wait for above tests first    D/w housestaff  Call with questions

## 2020-11-10 NOTE — CONSULT NOTE ADULT - SUBJECTIVE AND OBJECTIVE BOX
LENGTH OF HOSPITAL STAY: 1d    REASON FOR CONSULT: Anemia    HISTORY OF PRESENTING ILLNESS:   49 y/o F w/ PMHx gastritis, gastric ulcers, asthma, chronic anemia, sickle cell trait, sarcoid, presenting with after being found anemic at an outpatient appointment. Patient reports a 2 day history of shortness of breath on exertion as well as left leg extending from her foot to her knee. She endorses 2 falls in the past weeks, the first of which occurred while she was getting out of bed. She states at that time she felt she got up too quickly and became lightheaded and fell. Denies any head trauma, palpitations, or LOC. The second fall occurred when she was getting off the couch and felt like her leg felt went numb and gave out on her. Denies any LOC, but endorses feeling her heart rate increase. She has had the numbness and tingling in her left leg for about 3 weeks. Patient reports 1 black formed stool 2 days prior to admission. Denies any hemoptysis, hematemesis or vaginal bleeding.    Patient has a history of bleeding disorder and typically requires a blood transfusion every 3-4x month, however while she was menstruating she would sometimes require them 1-2x a month. Her most recent transfusion was in 2020. At that time she reports passing out on the street and waking up in the hospital. She is supposed to receive an iron transfusion every week, however she last received one in April. 2 years ago was admitted for hematemesis, at that time she had an endoscopy performed, was given a transfusion, and diagnosed with an ulcer. Most recent CBC in mid-October showed hemoglobin around 9.5. Patient has previously had extensive workup done for anemia and has never been given a diagnosis. Of note, her mother had an unknown bleeding disorder as well.    In  she was diagnosed with sarcoid after being admitted to the hospital for a 130lb weight loss of 1 months time. Patient notes that at that time she was drinking heavily and doing a lot of cocaine. She was treated with steroids and a pill to improve her appetite. At that time she reports having scleral icterus and elevated LFTs, which have remained high. She has since stopped taking that pill and has started smoking 1 joint of marijuana daily in its place.    ED Course: Temp 97.9, HR 94, /79, RR 16, saturating 96% on RA. Hemoglobin 6.0, WBC 5.75, platelets 303; MCV 64.6, ferritin 4, serum iron 11, TIBC 466; alk phos 724, bilirubin 1.7, AST/ALT 57/40; ordered for 1 unit PRBC (2020 11:15)    PAST MEDICAL & SURGICAL HISTORY:  Chronic anemia  Asthma  Gastric ulcer  Gastritis  Sickle-cell trait  Anemia  Sarcoid  Acid reflux disease with ulcer  Chronic GERD  Asthma  H/O  section    SOCIAL HISTORY:    ALLERGIES:  penicillin (Anaphylaxis)  Tomatoes (Pruritus)    MEDICATIONS:  STANDING MEDICATIONS  influenza   Vaccine 0.5 milliLiter(s) IntraMuscular once  iron sucrose IVPB 200 milliGRAM(s) IV Intermittent every 24 hours  pantoprazole  Injectable 40 milliGRAM(s) IV Push every 12 hours    PRN MEDICATIONS  ALBUTerol    90 MICROgram(s) HFA Inhaler 2 Puff(s) Inhalation every 6 hours PRN    VITALS:   T(F): 98.9  HR: 71  BP: 101/65  RR: 17  SpO2: 99%    LABS:                        6.7    5.47  )-----------( 239      ( 10 Nov 2020 07:51 )             23.8     11-10    136  |  103  |  11  ----------------------------<  90  3.8   |  22  |  0.86    Ca    8.9      10 Nov 2020 07:51  Phos  2.5     11-10  Mg     2.0     -10    TPro  6.8  /  Alb  3.2<L>  /  TBili  1.6<H>  /  DBili  x   /  AST  49<H>  /  ALT  36  /  AlkPhos  650<H>  11-10    PT/INR - ( 2020 09:15 )   PT: 12.2 sec;   INR: 1.02       PTT - ( 2020 09:15 )  PTT:34.7 sec    CARDIAC MARKERS ( 2020 09:15 )  x     / <0.01 ng/mL / 43 U/L / x     / <1.0 ng/mL    RADIOLOGY:  < from: CT Abdomen and Pelvis w/ IV Cont (20 @ 18:09) >  INTERPRETATION:  ATTENDING FINAL OVER-READ: Agree with the most of the below preliminary report, with addendum in the impression as below:    1.  Diffusely heterogeneously enhancing liver and diffuse periportal edema. Left hepatic lobe hypertrophy Diffusely edematous gallbladder. Findings are nonspecific, and can be seen in portal hypertension, fluid overload, or sarcoidosis given history. Correlation with liver CT/MR with intravenous contrast may be of benefit.  2.  No biliary ductal dilatation.  3.  Multiple enlarged upper abdominal and retroperitoneal lymph nodes, of unclear etiology.  4.  Normal spleen.    < end of copied text >    PHYSICAL EXAM:  GEN: No acute distress  HEENT:   LUNGS: Clear to auscultation bilaterally   HEART: S1/S2 present. RRR.   ABD: Soft, non-tender, non-distended. Bowel sounds present  EXT:  NEURO: AAOX3     LENGTH OF HOSPITAL STAY: 1d    REASON FOR CONSULT: Anemia    HISTORY OF PRESENTING ILLNESS:   51 y/o F w/ PMHx gastritis, gastric ulcers, asthma, chronic anemia, sickle cell trait, sarcoid, presenting with after being found anemic at an outpatient appointment. Patient reports a 2 day history of shortness of breath on exertion as well as left leg extending from her foot to her knee. She endorses 2 falls in the past weeks, the first of which occurred while she was getting out of bed. She states at that time she felt she got up too quickly and became lightheaded and fell. Denies any head trauma, palpitations, or LOC. The second fall occurred when she was getting off the couch and felt like her leg felt went numb and gave out on her. Denies any LOC, but endorses feeling her heart rate increase. She has had the numbness and tingling in her left leg for about 3 weeks. Patient reports 1 black formed stool 2 days prior to admission. Denies any hemoptysis, hematemesis or vaginal bleeding.    Patient has a history of bleeding disorder and typically requires a blood transfusion every 3-4x month, however while she was menstruating she would sometimes require them 1-2x a month. Her most recent transfusion was in 2020. At that time she reports passing out on the street and waking up in the hospital. She is supposed to receive an iron transfusion every week, however she last received one in April. 2 years ago was admitted for hematemesis, at that time she had an endoscopy performed, was given a transfusion, and diagnosed with an ulcer. Most recent CBC in mid-October showed hemoglobin around 9.5. Patient has previously had extensive workup done for anemia and has never been given a diagnosis. Of note, her mother had an unknown bleeding disorder as well.    In  she was diagnosed with sarcoid after being admitted to the hospital for a 130lb weight loss of 1 months time. Patient notes that at that time she was drinking heavily and doing a lot of cocaine. She was treated with steroids and a pill to improve her appetite. At that time she reports having scleral icterus and elevated LFTs, which have remained high. She has since stopped taking that pill and has started smoking 1 joint of marijuana daily in its place.    ED Course: Temp 97.9, HR 94, /79, RR 16, saturating 96% on RA. Hemoglobin 6.0, WBC 5.75, platelets 303; MCV 64.6, ferritin 4, serum iron 11, TIBC 466; alk phos 724, bilirubin 1.7, AST/ALT 57/40; ordered for 1 unit PRBC (2020 11:15)    Hematology/Oncology: She had followed Dr. Taylor from [facility], has since relocated. She needs a new Hematologist and has not seen him in a while. She is postmenopausal for 5 years. She had Hb 3.2 last year, was unable to find cause as per patient, had transfusions. She was maintained only on Pepcid. Last colonoscopy was in 2019 which was normal as per patient. Endoscopy showed diffuse inflammation as per patient, many years ago.     Mom had breast cancer in mid-40s. Dad had kidney cancer. Older brother had lung cancer (60s).  Not vegetarian or vegan.    PAST MEDICAL & SURGICAL HISTORY:  Chronic anemia  Asthma  Gastric ulcer  Gastritis  Sickle-cell trait  Anemia  Sarcoid  Acid reflux disease with ulcer  Chronic GERD  Asthma  H/O  section    SOCIAL HISTORY:    ALLERGIES:  penicillin (Anaphylaxis)  Tomatoes (Pruritus)    MEDICATIONS:  STANDING MEDICATIONS  influenza   Vaccine 0.5 milliLiter(s) IntraMuscular once  iron sucrose IVPB 200 milliGRAM(s) IV Intermittent every 24 hours  pantoprazole  Injectable 40 milliGRAM(s) IV Push every 12 hours    PRN MEDICATIONS  ALBUTerol    90 MICROgram(s) HFA Inhaler 2 Puff(s) Inhalation every 6 hours PRN    VITALS:   T(F): 98.9  HR: 71  BP: 101/65  RR: 17  SpO2: 99%    LABS:                        6.7    5.47  )-----------( 239      ( 10 Nov 2020 07:51 )             23.8     11-10    136  |  103  |  11  ----------------------------<  90  3.8   |  22  |  0.86    Ca    8.9      10 Nov 2020 07:51  Phos  2.5     11-10  Mg     2.0     11-10    TPro  6.8  /  Alb  3.2<L>  /  TBili  1.6<H>  /  DBili  x   /  AST  49<H>  /  ALT  36  /  AlkPhos  650<H>  11-10    PT/INR - ( 2020 09:15 )   PT: 12.2 sec;   INR: 1.02       PTT - ( 2020 09:15 )  PTT:34.7 sec    CARDIAC MARKERS ( 2020 09:15 )  x     / <0.01 ng/mL / 43 U/L / x     / <1.0 ng/mL    RADIOLOGY:  < from: CT Abdomen and Pelvis w/ IV Cont (20 @ 18:09) >  INTERPRETATION:  ATTENDING FINAL OVER-READ: Agree with the most of the below preliminary report, with addendum in the impression as below:    1.  Diffusely heterogeneously enhancing liver and diffuse periportal edema. Left hepatic lobe hypertrophy Diffusely edematous gallbladder. Findings are nonspecific, and can be seen in portal hypertension, fluid overload, or sarcoidosis given history. Correlation with liver CT/MR with intravenous contrast may be of benefit.  2.  No biliary ductal dilatation.  3.  Multiple enlarged upper abdominal and retroperitoneal lymph nodes, of unclear etiology.  4.  Normal spleen.    < end of copied text >    PHYSICAL EXAM:  GEN: No acute distress  HEENT:   LUNGS: Clear to auscultation bilaterally   HEART: S1/S2 present. RRR.   ABD: Soft, non-tender, non-distended. Bowel sounds present  EXT:  NEURO: AAOX3     LENGTH OF HOSPITAL STAY: 1d    REASON FOR CONSULT: Anemia    HISTORY OF PRESENTING ILLNESS:   51 y/o F w/ PMHx gastritis, gastric ulcers, asthma, chronic anemia, sickle cell trait, sarcoid, presenting with after being found anemic at an outpatient appointment. Patient reports a 2 day history of shortness of breath on exertion as well as left leg extending from her foot to her knee. She endorses 2 falls in the past weeks, the first of which occurred while she was getting out of bed. She states at that time she felt she got up too quickly and became lightheaded and fell. Denies any head trauma, palpitations, or LOC. The second fall occurred when she was getting off the couch and felt like her leg felt went numb and gave out on her. Denies any LOC, but endorses feeling her heart rate increase. She has had the numbness and tingling in her left leg for about 3 weeks. Patient reports 1 black formed stool 2 days prior to admission. Denies any hemoptysis, hematemesis or vaginal bleeding.    Patient has a history of bleeding disorder and typically requires a blood transfusion every 3-4x month, however while she was menstruating she would sometimes require them 1-2x a month. Her most recent transfusion was in 2020. At that time she reports passing out on the street and waking up in the hospital. She is supposed to receive an iron transfusion every week, however she last received one in April. 2 years ago was admitted for hematemesis, at that time she had an endoscopy performed, was given a transfusion, and diagnosed with an ulcer. Most recent CBC in mid-October showed hemoglobin around 9.5. Patient has previously had extensive workup done for anemia and has never been given a diagnosis. Of note, her mother had an unknown bleeding disorder as well.    In  she was diagnosed with sarcoid after being admitted to the hospital for a 130lb weight loss of 1 months time. Patient notes that at that time she was drinking heavily and doing a lot of cocaine. She was treated with steroids and a pill to improve her appetite. At that time she reports having scleral icterus and elevated LFTs, which have remained high. She has since stopped taking that pill and has started smoking 1 joint of marijuana daily in its place.    ED Course: Temp 97.9, HR 94, /79, RR 16, saturating 96% on RA. Hemoglobin 6.0, WBC 5.75, platelets 303; MCV 64.6, ferritin 4, serum iron 11, TIBC 466; alk phos 724, bilirubin 1.7, AST/ALT 57/40; ordered for 1 unit PRBC (2020 11:15)    Hematology/Oncology: She had followed Dr. Taylor from [facility], has since relocated. She needs a new Hematologist and has not seen him in a while. She is postmenopausal for 5 years. She had Hb 3.2 last year, was unable to find cause as per patient, had transfusions. She was maintained only on Pepcid. Last colonoscopy was in 2019 which was normal as per patient. Endoscopy showed diffuse inflammation as per patient, many years ago.     Mom had breast cancer in mid-40s. Dad had kidney cancer. Older brother had lung cancer (60s).  Not vegetarian or vegan.    PAST MEDICAL & SURGICAL HISTORY:  Chronic anemia  Asthma  Gastric ulcer  Gastritis  Sickle-cell trait  Anemia  Sarcoid  Acid reflux disease with ulcer  Chronic GERD  Asthma  H/O  section    SOCIAL HISTORY:    ALLERGIES:  penicillin (Anaphylaxis)  Tomatoes (Pruritus)    MEDICATIONS:  STANDING MEDICATIONS  influenza   Vaccine 0.5 milliLiter(s) IntraMuscular once  iron sucrose IVPB 200 milliGRAM(s) IV Intermittent every 24 hours  pantoprazole  Injectable 40 milliGRAM(s) IV Push every 12 hours    PRN MEDICATIONS  ALBUTerol    90 MICROgram(s) HFA Inhaler 2 Puff(s) Inhalation every 6 hours PRN    VITALS:   T(F): 98.9  HR: 71  BP: 101/65  RR: 17  SpO2: 99%    LABS:                        6.7    5.47  )-----------( 239      ( 10 Nov 2020 07:51 )             23.8     11-10    136  |  103  |  11  ----------------------------<  90  3.8   |  22  |  0.86    Ca    8.9      10 Nov 2020 07:51  Phos  2.5     11-10  Mg     2.0     11-10    TPro  6.8  /  Alb  3.2<L>  /  TBili  1.6<H>  /  DBili  x   /  AST  49<H>  /  ALT  36  /  AlkPhos  650<H>  11-10    PT/INR - ( 2020 09:15 )   PT: 12.2 sec;   INR: 1.02       PTT - ( 2020 09:15 )  PTT:34.7 sec    CARDIAC MARKERS ( 2020 09:15 )  x     / <0.01 ng/mL / 43 U/L / x     / <1.0 ng/mL    RADIOLOGY:  < from: CT Abdomen and Pelvis w/ IV Cont (20 @ 18:09) >  INTERPRETATION:  ATTENDING FINAL OVER-READ: Agree with the most of the below preliminary report, with addendum in the impression as below:    1.  Diffusely heterogeneously enhancing liver and diffuse periportal edema. Left hepatic lobe hypertrophy Diffusely edematous gallbladder. Findings are nonspecific, and can be seen in portal hypertension, fluid overload, or sarcoidosis given history. Correlation with liver CT/MR with intravenous contrast may be of benefit.  2.  No biliary ductal dilatation.  3.  Multiple enlarged upper abdominal and retroperitoneal lymph nodes, of unclear etiology.  4.  Normal spleen.    < end of copied text >    PHYSICAL EXAM:  GEN: No acute distress  HEENT: NCAT  LUNGS: Clear to auscultation bilaterally   HEART: S1/S2 present. RRR.   ABD: Soft, non-tender, non-distended. Bowel sounds present  EXT: No pitting edema  NEURO: AAOX3

## 2020-11-10 NOTE — CONSULT NOTE ADULT - ASSESSMENT
50F w/ a PMH significant for anemia requiring frequent transfusions, sarcoidosis, gastric ulcers, asthma, sickle cell trait, presenting with a hemoglobin of 6.0, dyspnea on exertion x2 days. Reports initially MALIK was attributed to excessive menstrual losses and had been on IV iron therapy in the past. Last Colonoscopy 2019 ([facility]) reportedly wnl. Reports last EGD in 2018 with gastritis and bleeding ulcer. Also endorsed unintentional weight loss, constipation, decreased appetite, as well as history of multiple different family cancers. Reported 1 episode of black formed stool however FOBT negative. Hemoglobin 6, MCV 64.4, ferritin 4, TIBC 477, transferrin 459. HD stable, Hb improved from 6-->6.7 with 1pRBC.     # microcytic anemia  Iron studies consistent with severe MALIK and hypoproliferative BM based on low retic index  - NPO past MN for EGD tomorrow  - obtain colonoscopy report from [facility]  - monitor CBC and transfuse to goal H/H  - appreciate hemonc recs    # Abnormal LT  mild elevation in TBili to 1.6 and   NO biliary ductal dilation on CT  CT A/P: Diffusely heterogeneously enhancing liver and diffuse periportal edema. Left hepatic lobe hypertrophy Diffusely edematous gallbladder. Findings are nonspecific, and can be seen in portal hypertension, fluid overload, or sarcoidosis given history. No biliary ductal dilatation. Multiple enlarged upper abdominal and retroperitoneal lymph nodes, of unclear etiology.  -     Recommendations discussed with primary team  Plan discussed with GI service attending    Bigg Clarke MD  PGY-4 GI fellow  Pager: [phone]       50F w/ a PMH significant for anemia requiring frequent transfusions, sarcoidosis, gastric ulcers, asthma, sickle cell trait, presenting with a hemoglobin of 6.0, dyspnea on exertion x2 days. Reports initially MALIK was attributed to excessive menstrual losses and had been on IV iron therapy in the past. Last Colonoscopy 2019 ([facility]) reportedly wnl. Reports last EGD in 2018 with gastritis and bleeding ulcer. Also endorsed unintentional weight loss, constipation, decreased appetite, as well as history of multiple different family cancers. Reported 1 episode of black formed stool however FOBT negative. Hemoglobin 6, MCV 64.4, ferritin 4, TIBC 477, transferrin 459. HD stable, Hb improved from 6-->6.7 with 1pRBC.     # microcytic anemia  Iron studies consistent with severe MALIK and hypoproliferative BM based on low retic index  - NPO past MN for EGD tomorrow  - obtain colonoscopy report from [facility]  - monitor CBC and transfuse to goal H/H  - appreciate hemonc recs    # Abnormal LT  mild elevation in TBili to 1.6 and   NO biliary ductal dilation on CT  CT A/P: Diffusely heterogeneously enhancing liver and diffuse periportal edema. Left hepatic lobe hypertrophy Diffusely edematous gallbladder. Findings are nonspecific, and can be seen in portal hypertension, fluid overload, or sarcoidosis given history. No biliary ductal dilatation. Multiple enlarged upper abdominal and retroperitoneal lymph nodes, of unclear etiology.  - Plan for EGD and EUS-guided liver bx tomorrow.  - NPO past MN    Recommendations discussed with primary team  Plan discussed with GI service attending    Bigg Clarke MD  PGY-4 GI fellow  Pager: [phone]       50F w/ a PMH significant for anemia requiring frequent transfusions, sarcoidosis, gastric ulcers, asthma, sickle cell trait, presenting with a hemoglobin of 6.0, dyspnea on exertion x2 days. Reports initially MALIK was attributed to excessive menstrual losses and had been on IV iron therapy in the past. Last Colonoscopy 2019 ([facility]) reportedly wnl. Reports last EGD in 2018 with gastritis and bleeding ulcer. Also endorsed unintentional weight loss, constipation, decreased appetite, as well as history of multiple different family cancers. Reported 1 episode of black formed stool however FOBT negative. Hemoglobin 6, MCV 64.4, ferritin 4, TIBC 477, transferrin 459. HD stable, Hb improved from 6-->6.7 with 1pRBC.     # microcytic anemia  Iron studies consistent with severe MALIK and hypoproliferative BM based on low retic index  - NPO past MN for EGD tomorrow  - obtain colonoscopy report from [facility]  - monitor CBC and transfuse to goal H/H  - appreciate hemonc recs    # Abnormal LT  mild elevation in TBili to 1.6 and   NO biliary ductal dilation on CT  CT A/P: Diffusely heterogeneously enhancing liver and diffuse periportal edema. Left hepatic lobe hypertrophy Diffusely edematous gallbladder. Findings are nonspecific, and can be seen in portal hypertension, fluid overload, or sarcoidosis given history. No biliary ductal dilatation. Multiple enlarged upper abdominal and retroperitoneal lymph nodes, of unclear etiology.  - MRI liver protocol  - Plan for EGD and EUS-guided liver bx tomorrow.  - NPO past MN    Recommendations discussed with primary team  Plan discussed with GI service attending    Bigg Clarke MD  PGY-4 GI fellow  Pager: [phone]

## 2020-11-10 NOTE — PROGRESS NOTE ADULT - PROBLEM SELECTOR PLAN 1
Patient presents with a hemoglobin of 6.0, dyspnea on exertion x2 days, and 1 episode of black formed stool, with iron studies, MCV, and reticulocyte count indicating a hypoproliferative microcytic iron deficiency anemia. Cause likely due to non-adherence to iron infusions, perhaps compounded by sickle cell trait. Singular episode of black stool likely related iron supplements. Denies any vaginal bleeding. Patient has hx of ulcer however unlikely to be actively bleeding given negative FOBT, lack of hematemesis, melena, or hematochezia.  -hemoglobin 6, MCV 64.4, ferritin 4, TIBC 477, transferrin 459  -reticulocyte index 0.44, indicating hypoproliferative cause   -GI consulted for possible bleed given drop of hemoglobin from 9.5 to 6 in <1 month  -heme/onc and rheumatology consulted, appreciate recommendations   -continue protonix 40mg IV BID  -patient to receive venofer 200mg x2 q24 hours  -s/p 2 units of PRBC, f/u repeat CBC   -follow up B12, folate  -maintain active type and screen. Patient presents with a hemoglobin of 6.0, dyspnea on exertion x2 days, and 1 episode of black formed stool, with iron studies, MCV, and reticulocyte count indicating a hypoproliferative microcytic iron deficiency anemia. Cause likely due to non-adherence to iron infusions, perhaps compounded by sickle cell trait. Singular episode of black stool likely related iron supplements. Denies any vaginal bleeding. Patient has hx of ulcer however unlikely to be actively bleeding given negative FOBT, lack of hematemesis, melena, or hematochezia.  -hemoglobin 6, MCV 64.4, ferritin 4, TIBC 477, transferrin 459  -reticulocyte index 0.44, indicating hypoproliferative cause   -GI consulted for possible bleed given drop of hemoglobin from 9.5 to 6 in <1 month  -heme/onc and rheumatology consulted, appreciate recommendations   -continue protonix 40mg IV BID  -patient to receive venofer 200mg x2 q24 hours  -s/p 2 units of PRBC, f/u repeat CBC in AM  -follow up B12, folate  -maintain active type and screen.

## 2020-11-10 NOTE — CONSULT NOTE ADULT - SUBJECTIVE AND OBJECTIVE BOX
GASTROENTEROLOGY CONSULT NOTE  HPI:  49 y/o F w/ PMHx gastritis, gastric ulcers, asthma, chronic anemia, sickle cell trait, sarcoid, presenting with after being found anemic at an outpatient appointment. Patient reports a 2 day history of shortness of breath on exertion as well as left leg extending from her foot to her knee. She endorses 2 falls in the past weeks, the first of which occurred while she was getting out of bed. She states at that time she felt she got up too quickly and became lightheaded and fell. Denies any head trauma, palpitations, or LOC. The second fall occurred when she was getting off the couch and felt like her leg felt went numb and gave out on her. Denies any LOC, but endorses feeling her heart rate increase. She has had the numbness and tingling in her left leg for about 3 weeks. Patient reports 1 black formed stool 2 days prior to admission. Denies any hemoptysis, hematemesis or vaginal bleeding.    Patient has a history of bleeding disorder and typically requires a blood transfusion every 3-4x month, however while she was menstruating she would sometimes require them 1-2x a month. Her most recent transfusion was in 2020. At that time she reports passing out on the street and waking up in the hospital. She is supposed to receive an iron transfusion every week, however she last received one in April. 2 years ago was admitted for hematemesis, at that time she had an endoscopy performed, was given a transfusion, and diagnosed with an ulcer. Most recent CBC in mid-October showed hemoglobin around 9.5. Patient has previously had extensive workup done for anemia and has never been given a diagnosis. Of note, her mother had an unknown bleeding disorder as well.    In  she was diagnosed with sarcoid after being admitted to the hospital for a 130lb weight loss of 1 months time. Patient notes that at that time she was drinking heavily and doing a lot of cocaine. She was treated with steroids and a pill to improve her appetite. At that time she reports having scleral icterus and elevated LFTs, which have remained high. She has since stopped taking that pill and has started smoking 1 joint of marijuana daily in its place.    ED Course: Temp 97.9, HR 94, /79, RR 16, saturating 96% on RA. Hemoglobin 6.0, WBC 5.75, platelets 303; MCV 64.6, ferritin 4, serum iron 11, TIBC 466; alk phos 724, bilirubin 1.7, AST/ALT 57/40; ordered for 1 unit PRBC (2020 11:15)    Allergies    penicillin (Anaphylaxis)  Tomatoes (Pruritus)    Intolerances      Home Medications:  Albuterol (Eqv-ProAir HFA) 90 mcg/inh inhalation aerosol: 2 puff(s) inhaled every 6 hours, As Needed (2020 19:12)  famotidine 20 mg oral tablet: 1 tab(s) orally 2 times a day (2020 19:12)  ferrous gluconate 324 mg (38 mg elemental iron) oral tablet: 1 tab(s) orally 3 times a day (2020 19:12)    MEDICATIONS:  MEDICATIONS  (STANDING):  influenza   Vaccine 0.5 milliLiter(s) IntraMuscular once  iron sucrose IVPB 200 milliGRAM(s) IV Intermittent every 24 hours  pantoprazole  Injectable 40 milliGRAM(s) IV Push every 12 hours    MEDICATIONS  (PRN):  ALBUTerol    90 MICROgram(s) HFA Inhaler 2 Puff(s) Inhalation every 6 hours PRN Shortness of Breath and/or Wheezing    PAST MEDICAL & SURGICAL HISTORY:  Chronic anemia    Asthma    Gastric ulcer    Gastritis    Sickle-cell trait    Anemia    Sarcoid    Acid reflux disease with ulcer    Chronic GERD    Asthma    H/O  section      FAMILY HISTORY:  FH: kidney cancer    FHx: lung cancer    FH: breast cancer    FH: hypertension    FHx: diabetes mellitus      SOCIAL HISTORY:  Tobac[employer]: [ ] Current, [ ] Former, [ ] Never; Pack Years:  Alcohol:  Illicit Drugs:    REVIEW OF SYSTEMS:  CONSTITUTIONAL: No fevers or chills  HEENT: No visual changes; No vertigo or throat pain   NECK: No pain or stiffness  RESPIRATORY: No cough, wheezing, hemoptysis; No shortness of breath  CARDIOVASCULAR: No chest pain or palpitations  GASTROINTESTINAL: No abdominal or epigastric pain. No nausea, vomiting, or hematemesis; No diarrhea or constipation.   GENITOURINARY: No dysuria, frequency or hematuria  NEUROLOGICAL: No numbness or weakness  SKIN: No itching, burning, rashes, or lesions   All other 10 review of systems is negative unless indicated above.    Vital Signs Last 24 Hrs  T(C): 36.9 (10 Nov 2020 12:05), Max: 37.2 (10 Nov 2020 05:47)  T(F): 98.5 (10 Nov 2020 12:05), Max: 98.9 (10 Nov 2020 05:47)  HR: 67 (10 Nov 2020 12:05) (67 - 75)  BP: 136/84 (10 Nov 2020 12:05) (101/65 - 136/84)  BP(mean): --  RR: 17 (10 Nov 2020 12:05) (17 - 18)  SpO2: 99% (10 Nov 2020 12:05) (99% - 99%)      PHYSICAL EXAM:    General: Well developed; well nourished; in no acute distress  Eyes: Anicteric sclerae, moist conjunctivae, pallor  HENT: Moist mucous membranes  Neck: Trachea midline, supple  Lungs: Normal respiratory effort, no intercostal retractions  Cardiovascular: RRR  Abdomen: Soft, non-tender non-distended; No rebound or guarding  Extremities: Normal range of motion, No clubbing, cyanosis or edema  Neurological: Alert and oriented x3  Skin: Warm and dry. No obvious rash    LABS:                        6.7    5.47  )-----------( 239      ( 10 Nov 2020 07:51 )             23.8     11-10    136  |  103  |  11  ----------------------------<  90  3.8   |  22  |  0.86    Ca    8.9      10 Nov 2020 07:51  Phos  2.5     11-10  Mg     2.0     11-10    TPro  6.4  /  Alb  x   /  TBili  x   /  DBili  x   /  AST  x   /  ALT  x   /  AlkPhos  x   11-10        PT/INR - ( 2020 09:15 )   PT: 12.2 sec;   INR: 1.02          PTT - ( 2020 09:15 )  PTT:34.7 sec    RADIOLOGY & ADDITIONAL STUDIES:     Reviewed GASTROENTEROLOGY CONSULT NOTE  HPI:  49 y/o F w/ PMHx gastritis, gastric ulcers, asthma, chronic anemia, sickle cell trait, sarcoid, presenting with after being found anemic at an outpatient appointment. Patient reports a 2 day history of shortness of breath on exertion as well as left leg extending from her foot to her knee. She endorses 2 falls in the past weeks, the first of which occurred while she was getting out of bed. She states at that time she felt she got up too quickly and became lightheaded and fell. Denies any head trauma, palpitations, or LOC. The second fall occurred when she was getting off the couch and felt like her leg felt went numb and gave out on her. Denies any LOC, but endorses feeling her heart rate increase. She has had the numbness and tingling in her left leg for about 3 weeks. Patient reports 1 black formed stool 2 days prior to admission. Denies any hemoptysis, hematemesis or vaginal bleeding.    Patient has a history of bleeding disorder and typically requires a blood transfusion every 3-4x month, however while she was menstruating she would sometimes require them 1-2x a month. Her most recent transfusion was in 2020. At that time she reports passing out on the street and waking up in the hospital. She is supposed to receive an iron transfusion every week, however she last received one in April. 2 years ago was admitted for hematemesis, at that time she had an endoscopy performed, was given a transfusion, and diagnosed with an ulcer. Most recent CBC in mid-October showed hemoglobin around 9.5. Patient has previously had extensive workup done for anemia and has never been given a diagnosis. Of note, her mother had an unknown bleeding disorder as well.    In  she was diagnosed with sarcoid after being admitted to the hospital for a 130lb weight loss of 1 months time. Patient notes that at that time she was drinking heavily and doing a lot of cocaine. She was treated with steroids and a pill to improve her appetite. At that time she reports having scleral icterus and elevated LFTs, which have remained high. She has since stopped taking that pill and has started smoking 1 joint of marijuana daily in its place.    ED Course: Temp 97.9, HR 94, /79, RR 16, saturating 96% on RA. Hemoglobin 6.0, WBC 5.75, platelets 303; MCV 64.6, ferritin 4, serum iron 11, TIBC 466; alk phos 724, bilirubin 1.7, AST/ALT 57/40; ordered for 1 unit PRBC (2020 11:15)    Allergies    penicillin (Anaphylaxis)  Tomatoes (Pruritus)    Intolerances      Home Medications:  Albuterol (Eqv-ProAir HFA) 90 mcg/inh inhalation aerosol: 2 puff(s) inhaled every 6 hours, As Needed (2020 19:12)  famotidine 20 mg oral tablet: 1 tab(s) orally 2 times a day (2020 19:12)  ferrous gluconate 324 mg (38 mg elemental iron) oral tablet: 1 tab(s) orally 3 times a day (2020 19:12)    MEDICATIONS:  MEDICATIONS  (STANDING):  influenza   Vaccine 0.5 milliLiter(s) IntraMuscular once  iron sucrose IVPB 200 milliGRAM(s) IV Intermittent every 24 hours  pantoprazole  Injectable 40 milliGRAM(s) IV Push every 12 hours    MEDICATIONS  (PRN):  ALBUTerol    90 MICROgram(s) HFA Inhaler 2 Puff(s) Inhalation every 6 hours PRN Shortness of Breath and/or Wheezing    PAST MEDICAL & SURGICAL HISTORY:  Chronic anemia    Asthma    Gastric ulcer    Gastritis    Sickle-cell trait    Anemia    Sarcoid    Acid reflux disease with ulcer    Chronic GERD    Asthma    H/O  section      FAMILY HISTORY:  FH: kidney cancer    FHx: lung cancer    FH: breast cancer    FH: hypertension    FHx: diabetes mellitus      SOCIAL HISTORY:  Tobacoo: None  Alcohol: None  Illicit Drugs: None    REVIEW OF SYSTEMS:  CONSTITUTIONAL: No fevers or chills  HEENT: No visual changes; No vertigo or throat pain   NECK: No pain or stiffness  RESPIRATORY: No cough, wheezing, hemoptysis; No shortness of breath  CARDIOVASCULAR: No chest pain or palpitations  GASTROINTESTINAL: No abdominal or epigastric pain. No nausea, vomiting, or hematemesis; No diarrhea or constipation.   GENITOURINARY: No dysuria, frequency or hematuria  NEUROLOGICAL: No numbness or weakness  SKIN: No itching, burning, rashes, or lesions   All other 10 review of systems is negative unless indicated above.    Vital Signs Last 24 Hrs  T(C): 36.9 (10 Nov 2020 12:05), Max: 37.2 (10 Nov 2020 05:47)  T(F): 98.5 (10 Nov 2020 12:05), Max: 98.9 (10 Nov 2020 05:47)  HR: 67 (10 Nov 2020 12:05) (67 - 75)  BP: 136/84 (10 Nov 2020 12:05) (101/65 - 136/84)  BP(mean): --  RR: 17 (10 Nov 2020 12:05) (17 - 18)  SpO2: 99% (10 Nov 2020 12:05) (99% - 99%)      PHYSICAL EXAM:    General: Well developed; well nourished; in no acute distress  Eyes: Anicteric sclerae, moist conjunctivae, pallor  HENT: Moist mucous membranes  Neck: Trachea midline, supple  Lungs: Normal respiratory effort, no intercostal retractions  Cardiovascular: RRR  Abdomen: Soft, non-tender non-distended; No rebound or guarding  Extremities: Normal range of motion, No clubbing, cyanosis or edema  Neurological: Alert and oriented x3  Skin: Warm and dry. No obvious rash    LABS:                        6.7    5.47  )-----------( 239      ( 10 Nov 2020 07:51 )             23.8     11-10    136  |  103  |  11  ----------------------------<  90  3.8   |  22  |  0.86    Ca    8.9      10 Nov 2020 07:51  Phos  2.5     11-10  Mg     2.0     11-10    TPro  6.4  /  Alb  x   /  TBili  x   /  DBili  x   /  AST  x   /  ALT  x   /  AlkPhos  x   11-10        PT/INR - ( 2020 09:15 )   PT: 12.2 sec;   INR: 1.02          PTT - ( 2020 09:15 )  PTT:34.7 sec    RADIOLOGY & ADDITIONAL STUDIES:     Reviewed

## 2020-11-10 NOTE — CONSULT NOTE ADULT - SUBJECTIVE AND OBJECTIVE BOX
50 year old female presents with anemia.   Patient has a history of sarcoidosis, presenting after being found anemic at an outpatient appointment.   Presented also with SOBOE / potential syncopal episode   Had one episode of black formed stool 2 days prior to admission. No hemoptysis, hematemesis or vaginal bleeding.   Patient has a history of a "bleeding disorder" and typically requires blood transfusions every 3-4 months. Required this more often while she was menstruating. Previous syncopal episodes with this also.   Used to receive transfusions every week, however she last received this two years ago.   Diagnosed with sarcoidosis in 2009 after losing 130lbs and being admitted to a hospital. At the time was drinking heavily and using cocaine. Unclear if there was any tissue diagnosis confirmed at that time. She was treated with steroids to improve her apatite? Reports scleral icterus and elevated LFTs. Unclear follow up thereafter. Not currently on steroids.     [facility] 50 year old female presents with anemia.   Patient has a history of sarcoidosis  She now presents after being found anemic at an outpatient appointment.   of recurrent transfusions for her anemia, last in   Presented also with SOBOE / potential syncopal episode   Had one episode of black formed stool 2 days prior to admission. No hemoptysis, hematemesis or vaginal bleeding.   Patient has a history of a "bleeding disorder" and typically requires blood transfusions every 3-4 months. Required this more often while she was menstruating. Previous syncopal episodes with this also.   Used to receive transfusions every week, however she last received this two years ago.   Diagnosed with sarcoidosis in 2009 after losing 130lbs and being admitted to a hospital. At the time was drinking heavily and using cocaine. Unclear if there was any tissue diagnosis confirmed at that time. She was treated with steroids to improve her apatite? Reports scleral icterus and elevated LFTs. Unclear follow up thereafter. Not currently on steroids.     [facility] 50 year old female presents with anemia.   Patient has a history of sarcoidosis.   In 2009 she presented to the hospital after having lost 130lbs. She underwent a lymph node biopsy which confirmed sarcoidosis. She states she also had sarcoid in her liver at that time - scleral icterus and elevated LFTs. Was started on steroids and states she felt better. 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 Not sure who managed her sarcoidosis at this time, but states she has been off steroids since 2018.   She now presents after being found anemic at an outpatient appointment. History of weekly transfusions for her anemia, last in 2018. She has a bleeding disorder but does not know which one? History of metmenorrhagia. Currently symptomatic with SOB, and potential syncopal episode. She states she has never had a bone marrow biopsy.     Currently notes SOBOE, fatigue and mild productive cough.   Had a painful rash on her upper thigh a week ago, not biopsied. Now resolved.   No history of uveitis, bells palsy, neuropathy.     PMH GERD, asthma, sarcoid, sickle cell trait  PMH c section  FH breast ca, HTN, RCC  SH former smoker, formerly used cocaine, social drinker   Meds see med rec   All PCN    VSS. Afebrile.   Physical exam notable for the following pertinent positives/negatives:  Comfortable, non toxic   Hyperpigmentation on L upper thigh  CV RRR no mrg  Resp CTAB  GI soft, no HSM  No synovitis  No oral ulcers  White sclera     Data reviewed, notable for:   CBC with anemia   BMP WNL   LFTs with alk phos 650   Iron studies c/w blood loss   FOBT negative  CT a/p with multiple enlarged LN and an enlarged screen

## 2020-11-10 NOTE — CONSULT NOTE ADULT - ATTENDING COMMENTS
Seen/discussed with fellow on 11/10  Agree with above  I have personally reviewed imaging  There appears to be evidence of several liver masses and given LFTs it is suggestive of an infiltrative liver condition  No jaundice  Plan EGD with EUS tomorrow if feasible  patient has had prior endoscopic evaluations. Please acquire records so we can avoid repeating if unnecessary

## 2020-11-10 NOTE — CONSULT NOTE ADULT - ASSESSMENT
49 y/o F w/ PMHx gastritis, gastric ulcers, asthma, chronic anemia, sickle cell trait, sarcoid, presenting with after being found anemic at an outpatient appointment.    #) DIAGNOSIS  - Microcytic anemia [Baseline 9.5?], possible 2/2 severe iron deficiency in context of hx of gastric ulcers vs low-grade hemolysis    - Sickle cell trait  - Severe iron deficiency  - Retroperitoneal and upper abdominal lymphadenopathy, no hepatosplenomegaly     #) PLAN  - s/p 1 U PRBC (11/09)  - Reticulocytosis not appropriate for degree of anemia   - Potential low-grade hemolysis: Mildly elevated TBili, low haptoglobin and LDH at upper limit of normal noted. Send direct Shandra. Low-grade hemolysis in the absence of reticulocytosis can occur in cases of severe iron deficiency anemia.    - Severe iron deficiency noted (Ferritin 4, TSat 2%). Give IV Venofer 200 mg once daily x 5 doses. If no improvement, will need in-patient bone marrow biopsy.    - Follow-up B12/Folate, SPEP, FLC as already sent by primary team. Add serum copper and Parvovirus B19 IgM/IgG.   - Will need GI consult for iron-deficiency anemia  - Maintain active T+S, transfuse if Hb < 7 or if symptomatic.   - Will review peripheral blood smear   - Obtain daily CBC with differential, reticulocyte count, haptoglobin     To discuss with Dr. Becerra   49 y/o F w/ PMHx gastritis, gastric ulcers, asthma, chronic anemia, sickle cell trait, sarcoid, presenting with after being found anemic at an outpatient appointment.    #) DIAGNOSIS  - Moderate microcytic anemia [Baseline 9.5?], possible 2/2 severe iron deficiency in context of hx of gastric ulcers vs low-grade hemolysis    - Sickle cell trait  - Severe iron deficiency  - Retroperitoneal and upper abdominal lymphadenopathy, no hepatosplenomegaly     #) PLAN  - s/p 1 U PRBC (11/09)  - Reticulocytosis not appropriate for degree of anemia   - Potential low-grade hemolysis: Mildly elevated TBili, low haptoglobin and LDH at upper limit of normal noted. Send direct Shandra. Low-grade hemolysis in the absence of reticulocytosis can occur in cases of severe iron deficiency anemia.    - Severe iron deficiency noted (Ferritin 4, TSat 2%). Give IV Venofer 200 mg once daily x 5 doses. If no improvement, will need in-patient bone marrow biopsy.    - Follow-up B12/Folate, SPEP, FLC as already sent by primary team. Add serum copper and Parvovirus B19 IgM/IgG. Hemoglobin electrophoresis.   - Will need GI consult for iron-deficiency anemia  - Maintain active T+S, transfuse if Hb < 7 or if symptomatic.   - Will review peripheral blood smear   - Obtain daily CBC with differential, reticulocyte count, haptoglobin     To discuss with Dr. Becerra   51 y/o F w/ PMHx gastritis, gastric ulcers, asthma, chronic anemia, sickle cell trait, sarcoid, presenting with after being found anemic at an outpatient appointment. She complains of longstanding dyspepsia, burning sensation with food/water, difficulty/pain with swallowing liquids/solids,     #) DIAGNOSIS  - Moderate microcytic anemia [Baseline 9.5?], possible 2/2 severe iron deficiency in context of hx of gastric ulcers vs low-grade hemolysis  - Hx of transfusions     - Sickle cell trait   - Severe iron deficiency  - Retroperitoneal and upper abdominal lymphadenopathy, no hepatosplenomegaly  - , lost 5 in first-trimester and 1 in third trimester  - Active smoker, ex-heavy drinker (now 2 drinks/week)    #) PLAN  - s/p 1 U PRBC ()  - Reticulocytosis not appropriate for degree of anemia, likely due to severe iron deficiency   - Potential low-grade hemolysis: Mildly elevated TBili, low haptoglobin and LDH at upper limit of normal noted. Send direct Shandra. Low-grade hemolysis in the absence of reticulocytosis can occur in cases of severe iron deficiency anemia.    - Severe iron deficiency noted (Ferritin 4, TSat 2%). Give IV Venofer 200 mg once daily x 5 doses.    - Follow-up B12/Folate, SPEP, FLC as already sent by primary team. Add serum copper and Parvovirus B19 IgM/IgG. Hemoglobin electrophoresis.   - Will need GI consult for iron-deficiency anemia given long-standing dyspepsia/GERD-like symptoms/melena/30 lb weight loss in 3 months  - Maintain active T+S, transfuse if Hb < 7 or if symptomatic.   - Will review peripheral blood smear   - Obtain daily CBC with differential. Obtain reticulocyte count, LDH, CMP and haptoglobin tomorrow AM  - She will need to follow-up with Gyn for Pap smear due to report of ASCUS. Will need follow-up with genetic counselling given large number of first-degree relatives with cancer    To discuss with Dr. Becerra   51 y/o F w/ PMHx gastritis, gastric ulcers, asthma, chronic anemia, sickle cell trait, sarcoid, presenting with after being found anemic at an outpatient appointment. She complains of longstanding dyspepsia, burning sensation with food/water, difficulty/pain with swallowing liquids/solids,     #) DIAGNOSIS  - Moderate microcytic anemia [Baseline 9.5?], possible 2/2 severe iron deficiency in context of hx of gastric ulcers vs low-grade hemolysis  - Hx of transfusions     - Sickle cell trait   - Severe iron deficiency  - Retroperitoneal and upper abdominal lymphadenopathy, no hepatosplenomegaly  - , lost 5 in first-trimester and 1 in third trimester  - Active smoker, ex-heavy drinker (now 2 drinks/week)    #) PLAN  - s/p 1 U PRBC ()  - Reticulocytosis not appropriate for degree of anemia, likely due to severe iron deficiency   - Potential low-grade hemolysis: Mildly elevated TBili, low haptoglobin and LDH at upper limit of normal noted. Send direct Shandra. Low-grade hemolysis in the absence of reticulocytosis can occur in cases of severe iron deficiency anemia.    - Severe iron deficiency noted (Ferritin 4, TSat 2%). Give IV Venofer 200 mg once daily x 5 doses.    - Follow-up B12/Folate, SPEP, FLC as already sent by primary team. Add serum copper and Parvovirus B19 IgM/IgG. Hemoglobin electrophoresis.   - Will need GI consult for iron-deficiency anemia given long-standing dyspepsia/GERD-like symptoms/melena/30 lb weight loss in 3 months  - Maintain active T+S, transfuse if Hb < 7 or if symptomatic.   - Peripheral blood smear showed two distinct red blood cell populations consistent with post-transfusion status. Hypochromia, microcytic.   - Obtain daily CBC with differential. Obtain reticulocyte count, LDH, CMP and haptoglobin tomorrow AM  - She will need to follow-up with Gyn for Pap smear due to report of ASCUS. Will need follow-up with genetic counselling given large number of first-degree relatives with cancer    To discuss with Dr. Becerra

## 2020-11-11 LAB
% ALBUMIN: 45.8 % — SIGNIFICANT CHANGE UP
% ALPHA 1: 5 % — SIGNIFICANT CHANGE UP
% ALPHA 2: 9.1 % — SIGNIFICANT CHANGE UP
% BETA: 14.8 % — SIGNIFICANT CHANGE UP
% GAMMA: 25.3 % — SIGNIFICANT CHANGE UP
% M SPIKE: 1.3 % — SIGNIFICANT CHANGE UP
ALBUMIN SERPL ELPH-MCNC: 2.9 G/DL — LOW (ref 3.6–5.5)
ALBUMIN SERPL ELPH-MCNC: 3.2 G/DL — LOW (ref 3.3–5)
ALBUMIN/GLOB SERPL ELPH: 0.8 RATIO — SIGNIFICANT CHANGE UP
ALP SERPL-CCNC: 679 U/L — HIGH (ref 40–120)
ALPHA1 GLOB SERPL ELPH-MCNC: 0.3 G/DL — SIGNIFICANT CHANGE UP (ref 0.1–0.4)
ALPHA2 GLOB SERPL ELPH-MCNC: 0.6 G/DL — SIGNIFICANT CHANGE UP (ref 0.5–1)
ALT FLD-CCNC: 36 U/L — SIGNIFICANT CHANGE UP (ref 10–45)
ANION GAP SERPL CALC-SCNC: 11 MMOL/L — SIGNIFICANT CHANGE UP (ref 5–17)
AST SERPL-CCNC: 54 U/L — HIGH (ref 10–40)
B-GLOBULIN SERPL ELPH-MCNC: 0.9 G/DL — SIGNIFICANT CHANGE UP (ref 0.5–1)
BASOPHILS # BLD AUTO: 0.04 K/UL — SIGNIFICANT CHANGE UP (ref 0–0.2)
BASOPHILS NFR BLD AUTO: 0.7 % — SIGNIFICANT CHANGE UP (ref 0–2)
BILIRUB SERPL-MCNC: 1.8 MG/DL — HIGH (ref 0.2–1.2)
BUN SERPL-MCNC: 10 MG/DL — SIGNIFICANT CHANGE UP (ref 7–23)
CALCIUM SERPL-MCNC: 8.6 MG/DL — SIGNIFICANT CHANGE UP (ref 8.4–10.5)
CHLORIDE SERPL-SCNC: 104 MMOL/L — SIGNIFICANT CHANGE UP (ref 96–108)
CO2 SERPL-SCNC: 21 MMOL/L — LOW (ref 22–31)
CREAT SERPL-MCNC: 0.98 MG/DL — SIGNIFICANT CHANGE UP (ref 0.5–1.3)
EOSINOPHIL # BLD AUTO: 0.2 K/UL — SIGNIFICANT CHANGE UP (ref 0–0.5)
EOSINOPHIL NFR BLD AUTO: 3.3 % — SIGNIFICANT CHANGE UP (ref 0–6)
GAMMA GLOBULIN: 1.6 G/DL — SIGNIFICANT CHANGE UP (ref 0.6–1.6)
GLUCOSE SERPL-MCNC: 84 MG/DL — SIGNIFICANT CHANGE UP (ref 70–99)
HCT VFR BLD CALC: 28.5 % — LOW (ref 34.5–45)
HGB BLD-MCNC: 8.2 G/DL — LOW (ref 11.5–15.5)
IMM GRANULOCYTES NFR BLD AUTO: 1 % — SIGNIFICANT CHANGE UP (ref 0–1.5)
INTERPRETATION SERPL IFE-IMP: SIGNIFICANT CHANGE UP
LYMPHOCYTES # BLD AUTO: 1.03 K/UL — SIGNIFICANT CHANGE UP (ref 1–3.3)
LYMPHOCYTES # BLD AUTO: 17.1 % — SIGNIFICANT CHANGE UP (ref 13–44)
M-SPIKE: 0.1 G/DL — HIGH (ref 0–0)
MAGNESIUM SERPL-MCNC: 1.9 MG/DL — SIGNIFICANT CHANGE UP (ref 1.6–2.6)
MCHC RBC-ENTMCNC: 20.3 PG — LOW (ref 27–34)
MCHC RBC-ENTMCNC: 28.8 GM/DL — LOW (ref 32–36)
MCV RBC AUTO: 70.5 FL — LOW (ref 80–100)
MONOCYTES # BLD AUTO: 0.95 K/UL — HIGH (ref 0–0.9)
MONOCYTES NFR BLD AUTO: 15.8 % — HIGH (ref 2–14)
NEUTROPHILS # BLD AUTO: 3.74 K/UL — SIGNIFICANT CHANGE UP (ref 1.8–7.4)
NEUTROPHILS NFR BLD AUTO: 62.1 % — SIGNIFICANT CHANGE UP (ref 43–77)
NRBC # BLD: 0 /100 WBCS — SIGNIFICANT CHANGE UP (ref 0–0)
PHOSPHATE SERPL-MCNC: 3.1 MG/DL — SIGNIFICANT CHANGE UP (ref 2.5–4.5)
PLATELET # BLD AUTO: 219 K/UL — SIGNIFICANT CHANGE UP (ref 150–400)
POTASSIUM SERPL-MCNC: 4.1 MMOL/L — SIGNIFICANT CHANGE UP (ref 3.5–5.3)
POTASSIUM SERPL-SCNC: 4.1 MMOL/L — SIGNIFICANT CHANGE UP (ref 3.5–5.3)
PROT PATTERN SERPL ELPH-IMP: SIGNIFICANT CHANGE UP
PROT SERPL-MCNC: 7 G/DL — SIGNIFICANT CHANGE UP (ref 6–8.3)
RBC # BLD: 4.04 M/UL — SIGNIFICANT CHANGE UP (ref 3.8–5.2)
RBC # FLD: 25.5 % — HIGH (ref 10.3–14.5)
SODIUM SERPL-SCNC: 136 MMOL/L — SIGNIFICANT CHANGE UP (ref 135–145)
T4 FREE SERPL-MCNC: 0.99 NG/DL — SIGNIFICANT CHANGE UP (ref 0.7–1.48)
TSH SERPL-MCNC: 4.31 UIU/ML — SIGNIFICANT CHANGE UP (ref 0.35–4.94)
VIT D25+D1,25 OH+D1,25 PNL SERPL-MCNC: 32.5 PG/ML — SIGNIFICANT CHANGE UP (ref 19.9–79.3)
WBC # BLD: 6.02 K/UL — SIGNIFICANT CHANGE UP (ref 3.8–10.5)
WBC # FLD AUTO: 6.02 K/UL — SIGNIFICANT CHANGE UP (ref 3.8–10.5)

## 2020-11-11 PROCEDURE — 99233 SBSQ HOSP IP/OBS HIGH 50: CPT | Mod: GC

## 2020-11-11 PROCEDURE — 99232 SBSQ HOSP IP/OBS MODERATE 35: CPT

## 2020-11-11 RX ORDER — DIPHENHYDRAMINE HCL 50 MG
25 CAPSULE ORAL ONCE
Refills: 0 | Status: COMPLETED | OUTPATIENT
Start: 2020-11-11 | End: 2020-11-11

## 2020-11-11 RX ADMIN — IRON SUCROSE 110 MILLIGRAM(S): 20 INJECTION, SOLUTION INTRAVENOUS at 18:14

## 2020-11-11 RX ADMIN — Medication 25 MILLIGRAM(S): at 00:06

## 2020-11-11 RX ADMIN — PANTOPRAZOLE SODIUM 40 MILLIGRAM(S): 20 TABLET, DELAYED RELEASE ORAL at 18:14

## 2020-11-11 RX ADMIN — PANTOPRAZOLE SODIUM 40 MILLIGRAM(S): 20 TABLET, DELAYED RELEASE ORAL at 06:02

## 2020-11-11 NOTE — PROGRESS NOTE ADULT - SUBJECTIVE AND OBJECTIVE BOX
50 year old female presents with anemia.   Patient has a history of sarcoidosis.     Interval Events  - MRI abdomen c/w inflammatory LAD and changes in the liver which could be c/w sarcoidosis  - Hgb improved  - Plan for endoscopy/biopsy today     HPI:   In 2009 she presented to the hospital after having lost 130lbs. She underwent a lymph node biopsy which confirmed sarcoidosis. She states she also had sarcoid in her liver at that time - scleral icterus and elevated LFTs. Was started on steroids and states she felt better. 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 Not sure who managed her sarcoidosis at this time, but states she has been off steroids since 2018.   She now presents after being found anemic at an outpatient appointment. History of weekly transfusions for her anemia, last in 2018. She has a bleeding disorder but does not know which one? History of metmenorrhagia. Currently symptomatic with SOB, and potential syncopal episode. She states she has never had a bone marrow biopsy.     Data reviewed, notable for:   CBC with anemia   BMP WNL   LFTs with alk phos 650   Iron studies c/w blood loss   1,25OHD WNL   FOBT negative  CT a/p with multiple enlarged LN and an enlarged screen   MRI abdomen c/w inflammatory LAD and changes in the liver which could be c/w sarcoidosis  CT chest high resolution no sarcoidosis

## 2020-11-11 NOTE — PROGRESS NOTE ADULT - PROBLEM SELECTOR PLAN 5
Patient w/ hx of asthma, only uses albuterol inhaler as needed at home. Per pt, she has only needed the inhaler once in the past month.   -c/w albuterol 90mcg inhaler PRN
Patient w/ hx of asthma, only uses albuterol inhaler as needed at home. Per pt, she has only needed the inhaler once in the past month.   -c/w albuterol 90mcg inhaler PRN

## 2020-11-11 NOTE — PROGRESS NOTE ADULT - PROBLEM SELECTOR PLAN 1
Patient presents with a hemoglobin of 6.0, dyspnea on exertion x2 days, and 1 episode of black formed stool, with iron studies, MCV, and reticulocyte count indicating a hypoproliferative microcytic iron deficiency anemia. Cause likely due to non-adherence to iron infusions, perhaps compounded by sickle cell trait. Singular episode of black stool likely related iron supplements. Denies any vaginal bleeding. Patient has hx of ulcer however unlikely to be actively bleeding given negative FOBT, lack of hematemesis, melena, or hematochezia.  -hemoglobin 6, MCV 64.4, ferritin 4, TIBC 477, transferrin 459  -reticulocyte index 0.44, indicating hypoproliferative cause   -GI consulted for possible bleed given drop of hemoglobin from 9.5 to 6 in <1 month  -heme/onc and rheumatology consulted, appreciate recommendations   -continue protonix 40mg IV BID  -patient to receive venofer 200mg x2 q24 hours  -s/p 2 units of PRBC, hgb stable at 8  -B12, folate wnl  -maintain active type and screen.

## 2020-11-11 NOTE — PROGRESS NOTE ADULT - PROBLEM SELECTOR PLAN 3
Patient with history of sarcoidosis and poor follow, up. Diagnosed at [facility] in 2009. Attempted to obtain collateral from [facility] but they were unable to provide patient's records. Spoke with  at [facility] (patient sees PCP and hematologist w/ [facility]) and requested that records be faxed. CT A/P with hepatic hypodensities that are nonspecific but consistent with sarcoidosis.   -Rheum consulted, appreciate recommendations  -CT chest no acute finding
Patient with history of sarcoidosis and poor follow, up. Diagnosed at [facility] in 2009. Attempted to obtain collateral from [facility] but they were unable to provide patient's records. Spoke with  at [facility] (patient sees PCP and hematologist w/ [facility]) and requested that records be faxed. CT A/P with hepatic hypodensities that are nonspecific but consistent with sarcoidosis.   -Rheum consulted, appreciate recommendations  -f/u CT chest

## 2020-11-11 NOTE — PROGRESS NOTE ADULT - PROBLEM SELECTOR PLAN 2
Patient presents with elevated a phos, ggt, as bilirubin with direct bili predominance, indicating a cholestatic picture. DDx for intrahepatic can include sarcoidosis given patient history. Hepatic malignancy must be considered as well given history of extensive alcohol use. Metastatic malignancy as well as malignancy resulting in extrahepatic cholestasis must also be ruled out given constitutional symptoms the patient has been experiencing. Obstruction can also be a cause of elevated alk phos given discomfort during exam, although less likely given pawel tenderness as well as lack of history of abdominal pain.  -alkaline phosphate 724, , bilirubin 1.7, direct bili 1.0  -CT/AP: Marked gallbladder wall  thickening/edema and mucosal enhancement also noted. Gastroenterology consult suggested for  further evaluation.  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 However given constitutional symptoms, workup for monoclonal gammopathy must be worked up with SPEP/MGUS to assess for additional malignancy  -follow up free light chains  -follow up spep.
Patient presents with elevated a phos, ggt, as bilirubin with direct bili predominance, indicating a cholestatic picture. DDx for intrahepatic can include sarcoidosis given patient history. Hepatic malignancy must be considered as well given history of extensive alcohol use. Metastatic malignancy as well as malignancy resulting in extrahepatic cholestasis must also be ruled out given constitutional symptoms the patient has been experiencing. Obstruction can also be a cause of elevated alk phos given discomfort during exam, although less likely given pawel tenderness as well as lack of history of abdominal pain.  -alkaline phosphate 724, , bilirubin 1.7, direct bili 1.0  -CT/AP: Marked gallbladder wall  thickening/edema and mucosal enhancement also noted. Gastroenterology consult suggested for  further evaluation.  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 However given constitutional symptoms, workup for monoclonal gammopathy must be worked up with SPEP/MGUS to assess for additional malignancy  -follow up free light chains  -follow up spep.

## 2020-11-11 NOTE — PROGRESS NOTE ADULT - PROBLEM SELECTOR PLAN 4
Patient with history of sickle cell trait, follows with hematologist at [facility].   -spoke w/  at [facility] to have records faxed   -heme following, appreciate any recommendations
Patient with history of sickle cell trait, follows with hematologist at [facility].   -spoke w/  at [facility] to have records faxed   -heme following, appreciate any recommendations

## 2020-11-11 NOTE — PROGRESS NOTE ADULT - SUBJECTIVE AND OBJECTIVE BOX
GASTROENTEROLOGY PROGRESS NOTE  Patient seen and examined at bedside. Pt reports feeling better. Hb improved after pRBC and IV iron therapy    PERTINENT REVIEW OF SYSTEMS:  As noted above    Allergies    penicillin (Anaphylaxis)  Tomatoes (Pruritus)    Intolerances      MEDICATIONS:  MEDICATIONS  (STANDING):  influenza   Vaccine 0.5 milliLiter(s) IntraMuscular once  iron sucrose IVPB 200 milliGRAM(s) IV Intermittent every 24 hours  pantoprazole  Injectable 40 milliGRAM(s) IV Push every 12 hours    MEDICATIONS  (PRN):  ALBUTerol    90 MICROgram(s) HFA Inhaler 2 Puff(s) Inhalation every 6 hours PRN Shortness of Breath and/or Wheezing    Vital Signs Last 24 Hrs  T(C): 36.8 (11 Nov 2020 14:34), Max: 36.9 (11 Nov 2020 06:07)  T(F): 98.3 (11 Nov 2020 14:34), Max: 98.4 (11 Nov 2020 06:07)  HR: 70 (11 Nov 2020 14:34) (63 - 70)  BP: 119/66 (11 Nov 2020 14:34) (116/69 - 122/76)  BP(mean): --  RR: 18 (11 Nov 2020 14:34) (17 - 18)  SpO2: 99% (11 Nov 2020 14:34) (99% - 100%)    PHYSICAL EXAM:    General: Well developed; in no acute distress  HEENT: MMM, conjunctiva and sclera clear  Gastrointestinal: Soft non-tender non-distended;  No rebound or guarding  Skin: Warm and dry. No obvious rash    LABS:                        8.2    6.02  )-----------( 219      ( 11 Nov 2020 07:54 )             28.5     11-11    136  |  104  |  10  ----------------------------<  84  4.1   |  21<L>  |  0.98    Ca    8.6      11 Nov 2020 07:54  Phos  3.1     11-11  Mg     1.9     11-11    TPro  7.0  /  Alb  3.2<L>  /  TBili  1.8<H>  /  DBili  x   /  AST  54<H>  /  ALT  36  /  AlkPhos  679<H>  11-11                      RADIOLOGY & ADDITIONAL STUDIES:  Reviewed

## 2020-11-11 NOTE — PROGRESS NOTE ADULT - SUBJECTIVE AND OBJECTIVE BOX
OVERNIGHT EVENTS: DAMON    SUBJECTIVE / INTERVAL HPI: Patient seen and examined at bedside. Reports feeling much better, no acute complaints, denies chest pain, SOB, ab pain    VITAL SIGNS:  Vital Signs Last 24 Hrs  T(C): 36.9 (11 Nov 2020 06:07), Max: 36.9 (11 Nov 2020 06:07)  T(F): 98.4 (11 Nov 2020 06:07), Max: 98.4 (11 Nov 2020 06:07)  HR: 69 (11 Nov 2020 06:07) (63 - 69)  BP: 116/69 (11 Nov 2020 06:07) (116/69 - 122/76)  BP(mean): --  RR: 17 (11 Nov 2020 06:07) (17 - 18)  SpO2: 99% (11 Nov 2020 06:07) (99% - 100%)    PHYSICAL EXAM:    General: WDWN  HEENT: NC/AT; PERRL, anicteric sclera; MMM  Neck: supple  Cardiovascular: +S1/S2, RRR  Respiratory: CTA B/L; no W/R/R  Gastrointestinal: soft, NT/ND; +BSx4  Extremities: WWP; no edema, clubbing or cyanosis  Vascular: 2+ radial, DP/PT pulses B/L  Neurological: AAOx3; no focal deficits    MEDICATIONS:  MEDICATIONS  (STANDING):  influenza   Vaccine 0.5 milliLiter(s) IntraMuscular once  iron sucrose IVPB 200 milliGRAM(s) IV Intermittent every 24 hours  pantoprazole  Injectable 40 milliGRAM(s) IV Push every 12 hours    MEDICATIONS  (PRN):  ALBUTerol    90 MICROgram(s) HFA Inhaler 2 Puff(s) Inhalation every 6 hours PRN Shortness of Breath and/or Wheezing      ALLERGIES:  Allergies    penicillin (Anaphylaxis)  Tomatoes (Pruritus)    Intolerances        LABS:                        8.2    6.02  )-----------( 219      ( 11 Nov 2020 07:54 )             28.5     11-11    136  |  104  |  10  ----------------------------<  84  4.1   |  21<L>  |  0.98    Ca    8.6      11 Nov 2020 07:54  Phos  3.1     11-11  Mg     1.9     11-11    TPro  7.0  /  Alb  3.2<L>  /  TBili  1.8<H>  /  DBili  x   /  AST  54<H>  /  ALT  36  /  AlkPhos  679<H>  11-11        CAPILLARY BLOOD GLUCOSE          RADIOLOGY & ADDITIONAL TESTS: Reviewed.

## 2020-11-11 NOTE — PROGRESS NOTE ADULT - PROBLEM SELECTOR PLAN 6
F: none   E: replete as necessary   N: Regular diet, NPO at midnight pending GI recommendations   Dispo: RMF   Dvt ppx: none until bleed r/o
F: none   E: replete as necessary   N: Regular diet, NPO at midnight pending GI recommendations   Dispo: RMF   Dvt ppx: none until bleed r/o

## 2020-11-12 ENCOUNTER — TRANSCRIPTION ENCOUNTER (OUTPATIENT)
Age: 50
End: 2020-11-12

## 2020-11-12 VITALS
DIASTOLIC BLOOD PRESSURE: 78 MMHG | OXYGEN SATURATION: 97 % | HEART RATE: 65 BPM | SYSTOLIC BLOOD PRESSURE: 132 MMHG | RESPIRATION RATE: 16 BRPM | TEMPERATURE: 98 F

## 2020-11-12 PROBLEM — Z00.00 ENCOUNTER FOR PREVENTIVE HEALTH EXAMINATION: Status: ACTIVE | Noted: 2020-11-12

## 2020-11-12 LAB
ACE SERPL-CCNC: 74 U/L — SIGNIFICANT CHANGE UP (ref 14–82)
ALBUMIN SERPL ELPH-MCNC: 3.2 G/DL — LOW (ref 3.3–5)
ALP SERPL-CCNC: 726 U/L — HIGH (ref 40–120)
ALT FLD-CCNC: 42 U/L — SIGNIFICANT CHANGE UP (ref 10–45)
ANION GAP SERPL CALC-SCNC: 11 MMOL/L — SIGNIFICANT CHANGE UP (ref 5–17)
AST SERPL-CCNC: 63 U/L — HIGH (ref 10–40)
BILIRUB SERPL-MCNC: 1.8 MG/DL — HIGH (ref 0.2–1.2)
BLD GP AB SCN SERPL QL: NEGATIVE — SIGNIFICANT CHANGE UP
BUN SERPL-MCNC: 13 MG/DL — SIGNIFICANT CHANGE UP (ref 7–23)
CALCIUM SERPL-MCNC: 8.4 MG/DL — SIGNIFICANT CHANGE UP (ref 8.4–10.5)
CHLORIDE SERPL-SCNC: 102 MMOL/L — SIGNIFICANT CHANGE UP (ref 96–108)
CO2 SERPL-SCNC: 21 MMOL/L — LOW (ref 22–31)
CREAT SERPL-MCNC: 0.95 MG/DL — SIGNIFICANT CHANGE UP (ref 0.5–1.3)
GAMMA INTERFERON BACKGROUND BLD IA-ACNC: 0.1 IU/ML — SIGNIFICANT CHANGE UP
GLUCOSE SERPL-MCNC: 88 MG/DL — SIGNIFICANT CHANGE UP (ref 70–99)
HCT VFR BLD CALC: 29.6 % — LOW (ref 34.5–45)
HGB BLD-MCNC: 8.4 G/DL — LOW (ref 11.5–15.5)
M TB IFN-G BLD-IMP: ABNORMAL
M TB IFN-G CD4+ BCKGRND COR BLD-ACNC: 0.01 IU/ML — SIGNIFICANT CHANGE UP
M TB IFN-G CD4+CD8+ BCKGRND COR BLD-ACNC: 0.01 IU/ML — SIGNIFICANT CHANGE UP
MAGNESIUM SERPL-MCNC: 2 MG/DL — SIGNIFICANT CHANGE UP (ref 1.6–2.6)
MCHC RBC-ENTMCNC: 20 PG — LOW (ref 27–34)
MCHC RBC-ENTMCNC: 28.4 GM/DL — LOW (ref 32–36)
MCV RBC AUTO: 70.3 FL — LOW (ref 80–100)
NRBC # BLD: 0 /100 WBCS — SIGNIFICANT CHANGE UP (ref 0–0)
PHOSPHATE SERPL-MCNC: 3.1 MG/DL — SIGNIFICANT CHANGE UP (ref 2.5–4.5)
PLATELET # BLD AUTO: 222 K/UL — SIGNIFICANT CHANGE UP (ref 150–400)
POTASSIUM SERPL-MCNC: 3.9 MMOL/L — SIGNIFICANT CHANGE UP (ref 3.5–5.3)
POTASSIUM SERPL-SCNC: 3.9 MMOL/L — SIGNIFICANT CHANGE UP (ref 3.5–5.3)
PROT SERPL-MCNC: 7.1 G/DL — SIGNIFICANT CHANGE UP (ref 6–8.3)
QUANT TB PLUS MITOGEN MINUS NIL: 0.06 IU/ML — SIGNIFICANT CHANGE UP
RBC # BLD: 4.21 M/UL — SIGNIFICANT CHANGE UP (ref 3.8–5.2)
RBC # FLD: 27.9 % — HIGH (ref 10.3–14.5)
RH IG SCN BLD-IMP: POSITIVE — SIGNIFICANT CHANGE UP
SODIUM SERPL-SCNC: 134 MMOL/L — LOW (ref 135–145)
WBC # BLD: 6.46 K/UL — SIGNIFICANT CHANGE UP (ref 3.8–10.5)
WBC # FLD AUTO: 6.46 K/UL — SIGNIFICANT CHANGE UP (ref 3.8–10.5)

## 2020-11-12 PROCEDURE — 82248 BILIRUBIN DIRECT: CPT

## 2020-11-12 PROCEDURE — 87635 SARS-COV-2 COVID-19 AMP PRB: CPT

## 2020-11-12 PROCEDURE — 85025 COMPLETE CBC W/AUTO DIFF WBC: CPT

## 2020-11-12 PROCEDURE — 82306 VITAMIN D 25 HYDROXY: CPT

## 2020-11-12 PROCEDURE — 80053 COMPREHEN METABOLIC PANEL: CPT

## 2020-11-12 PROCEDURE — 84466 ASSAY OF TRANSFERRIN: CPT

## 2020-11-12 PROCEDURE — A9585: CPT

## 2020-11-12 PROCEDURE — 36415 COLL VENOUS BLD VENIPUNCTURE: CPT

## 2020-11-12 PROCEDURE — 84100 ASSAY OF PHOSPHORUS: CPT

## 2020-11-12 PROCEDURE — 85730 THROMBOPLASTIN TIME PARTIAL: CPT

## 2020-11-12 PROCEDURE — 86334 IMMUNOFIX E-PHORESIS SERUM: CPT

## 2020-11-12 PROCEDURE — 85610 PROTHROMBIN TIME: CPT

## 2020-11-12 PROCEDURE — 82728 ASSAY OF FERRITIN: CPT

## 2020-11-12 PROCEDURE — 86850 RBC ANTIBODY SCREEN: CPT

## 2020-11-12 PROCEDURE — 99232 SBSQ HOSP IP/OBS MODERATE 35: CPT

## 2020-11-12 PROCEDURE — P9016: CPT

## 2020-11-12 PROCEDURE — 84484 ASSAY OF TROPONIN QUANT: CPT

## 2020-11-12 PROCEDURE — 84443 ASSAY THYROID STIM HORMONE: CPT

## 2020-11-12 PROCEDURE — 86747 PARVOVIRUS ANTIBODY: CPT

## 2020-11-12 PROCEDURE — 82977 ASSAY OF GGT: CPT

## 2020-11-12 PROCEDURE — 71250 CT THORAX DX C-: CPT

## 2020-11-12 PROCEDURE — 83615 LACTATE (LD) (LDH) ENZYME: CPT

## 2020-11-12 PROCEDURE — 82785 ASSAY OF IGE: CPT

## 2020-11-12 PROCEDURE — 85027 COMPLETE CBC AUTOMATED: CPT

## 2020-11-12 PROCEDURE — 82746 ASSAY OF FOLIC ACID SERUM: CPT

## 2020-11-12 PROCEDURE — 82330 ASSAY OF CALCIUM: CPT

## 2020-11-12 PROCEDURE — 80074 ACUTE HEPATITIS PANEL: CPT

## 2020-11-12 PROCEDURE — 83010 ASSAY OF HAPTOGLOBIN QUANT: CPT

## 2020-11-12 PROCEDURE — 82247 BILIRUBIN TOTAL: CPT

## 2020-11-12 PROCEDURE — 82525 ASSAY OF COPPER: CPT

## 2020-11-12 PROCEDURE — 83735 ASSAY OF MAGNESIUM: CPT

## 2020-11-12 PROCEDURE — 99285 EMERGENCY DEPT VISIT HI MDM: CPT

## 2020-11-12 PROCEDURE — 83521 IG LIGHT CHAINS FREE EACH: CPT

## 2020-11-12 PROCEDURE — 82784 ASSAY IGA/IGD/IGG/IGM EACH: CPT

## 2020-11-12 PROCEDURE — 82553 CREATINE MB FRACTION: CPT

## 2020-11-12 PROCEDURE — 84165 PROTEIN E-PHORESIS SERUM: CPT

## 2020-11-12 PROCEDURE — 82164 ANGIOTENSIN I ENZYME TEST: CPT

## 2020-11-12 PROCEDURE — 74183 MRI ABD W/O CNTR FLWD CNTR: CPT

## 2020-11-12 PROCEDURE — 84155 ASSAY OF PROTEIN SERUM: CPT

## 2020-11-12 PROCEDURE — 82550 ASSAY OF CK (CPK): CPT

## 2020-11-12 PROCEDURE — 86901 BLOOD TYPING SEROLOGIC RH(D): CPT

## 2020-11-12 PROCEDURE — 83550 IRON BINDING TEST: CPT

## 2020-11-12 PROCEDURE — 83540 ASSAY OF IRON: CPT

## 2020-11-12 PROCEDURE — 99239 HOSP IP/OBS DSCHRG MGMT >30: CPT | Mod: GC

## 2020-11-12 PROCEDURE — 85045 AUTOMATED RETICULOCYTE COUNT: CPT

## 2020-11-12 PROCEDURE — 86480 TB TEST CELL IMMUN MEASURE: CPT

## 2020-11-12 PROCEDURE — 82272 OCCULT BLD FECES 1-3 TESTS: CPT

## 2020-11-12 PROCEDURE — 86900 BLOOD TYPING SEROLOGIC ABO: CPT

## 2020-11-12 PROCEDURE — 84439 ASSAY OF FREE THYROXINE: CPT

## 2020-11-12 PROCEDURE — 74177 CT ABD & PELVIS W/CONTRAST: CPT

## 2020-11-12 PROCEDURE — 82652 VIT D 1 25-DIHYDROXY: CPT

## 2020-11-12 PROCEDURE — 86704 HEP B CORE ANTIBODY TOTAL: CPT

## 2020-11-12 PROCEDURE — 86923 COMPATIBILITY TEST ELECTRIC: CPT

## 2020-11-12 PROCEDURE — 94640 AIRWAY INHALATION TREATMENT: CPT

## 2020-11-12 PROCEDURE — 36430 TRANSFUSION BLD/BLD COMPNT: CPT

## 2020-11-12 PROCEDURE — 82607 VITAMIN B-12: CPT

## 2020-11-12 PROCEDURE — 71045 X-RAY EXAM CHEST 1 VIEW: CPT

## 2020-11-12 RX ORDER — ALBUTEROL 90 UG/1
2 AEROSOL, METERED ORAL
Qty: 0 | Refills: 0 | DISCHARGE

## 2020-11-12 RX ORDER — PANTOPRAZOLE SODIUM 20 MG/1
1 TABLET, DELAYED RELEASE ORAL
Qty: 60 | Refills: 0
Start: 2020-11-12 | End: 2020-12-11

## 2020-11-12 RX ADMIN — PANTOPRAZOLE SODIUM 40 MILLIGRAM(S): 20 TABLET, DELAYED RELEASE ORAL at 05:27

## 2020-11-12 NOTE — DISCHARGE NOTE NURSING/CASE MANAGEMENT/SOCIAL WORK - NSDCPEEMAIL_GEN_ALL_CORE
[facility] for Tobacco Control email [email or web address]

## 2020-11-12 NOTE — PROGRESS NOTE ADULT - ASSESSMENT
49 y/o F w/ PMHx gastritis, gastric ulcers, asthma, chronic anemia, sickle cell trait, sarcoid, presenting with after being found anemic at an outpatient appointment. She complains of longstanding dyspepsia, burning sensation with food/water, difficulty/pain with swallowing liquids/solids,     #) DIAGNOSIS  - Moderate microcytic anemia [Baseline 9.5?], possible 2/2 severe iron deficiency in context of hx of gastric ulcers vs low-grade hemolysis  - Hx of transfusions     - Sickle cell trait   - Severe iron deficiency  - Retroperitoneal and upper abdominal lymphadenopathy, no hepatosplenomegaly  - Possible MGUS   - , lost 5 in first-trimester and 1 in third trimester  - Active smoker, ex-heavy drinker (now 2 drinks/week)    #) PLAN  - s/p 1 U PRBC ()  - Reticulocytosis not appropriate for degree of anemia, likely due to severe iron deficiency   - Severe iron deficiency noted (Ferritin 4, TSat 2%). Give IV Venofer 200 mg once daily x 5 doses.    - GI recommendations for iron-deficiency anemia given long-standing dyspepsia/GERD-like symptoms/melena/30 lb weight loss in 3 months  - Maintain active T+S, transfuse if Hb < 7 or if symptomatic.   - She will need to follow-up with Gyn for Pap smear due to report of ASCUS. Will need follow-up with genetic counselling given large number of first-degree relatives with cancer  - She will follow-up with [facility] office with Dr. Hester in 2 weeks on  for evaluation of anemia/MGUS   
50 year old female presents with anemia.   Patient has a history of sarcoidosis.   Given her history of abdominal sarcoid, suspect that this could be active again. She is off steroids. She further has what appears to be a resolving EN rash.   Given that her diagnosis is remote and not confirmed / only patient reported, it would be preferable to obtain a biopsy to ensure that her liver disease is due to sarcoidosis. Her previous biopsy was of her cervical LN. That said, with inflammatory changes that are consistent on MRI and a history of sarcoidosis, could consider treating empirically with steroids / follow up imaging if unable to obtain biopsy. Ultimately patient will need TNFi therapy, as MTX would not be used in the setting of liver disease.     Plan for discharge today  Biopsy not done as inpatient  Please discharge on Prednisone 50mg daily   Patient to follow up with me in one week     D/w housestaspring and Dr. Pratt   Call with questions [phone]     
50 year old female presents with anemia.   Patient has a history of sarcoidosis.   Given her history of abdominal sarcoid, suspect that this could be active again. She is off steroids. She further has what appears to be a resolving EN rash.   Given that her diagnosis is remote and not confirmed / only patient reported, it would be preferable to obtain a biopsy to ensure that her liver disease is due to sarcoidosis. Her previous biopsy was of her cervical LN. That said, with inflammatory changes that are consistent on MRI and a history of sarcoidosis, could consider treating empirically with steroids / follow up imaging if unable to obtain biopsy. Ultimately patient will need TNFi therapy, as MTX would not be used in the setting of liver disease.   If biopsy not done tomorrow before discharge then will consider steroid therapy.     Pending:   - ACE,, 25OHD, ionized calcium, quantitative immunoglobulins    D/w Dr. Pratt   Call with questions 
50F w/ a PMH significant for anemia requiring frequent transfusions, sarcoidosis, gastric ulcers, asthma, sickle cell trait, presenting with a hemoglobin of 6.0, dyspnea on exertion x2 days. Reports initially MALIK was attributed to excessive menstrual losses and had been on IV iron therapy in the past. Last Colonoscopy 2019 ([facility]) reportedly wnl. Reports last EGD in 2018 with gastritis and bleeding ulcer. Also endorsed unintentional weight loss, constipation, decreased appetite, as well as history of multiple different family cancers. Reported 1 episode of black formed stool however FOBT negative. Hemoglobin 6, MCV 64.4, ferritin 4, TIBC 477, transferrin 459. HD stable, Hb improved from 6-->6.7 with 1pRBC.     # microcytic anemia  Iron studies consistent with severe MALIK and hypoproliferative BM based on low retic index  Hb improved with 1 pRBC and IV iron  - obtain colonoscopy report from [facility]  - monitor CBC and transfuse to goal H/H  - Plan for EGD/colonoscopy tomorrow  - Please order split-dose prep with 4L GoLytely: 2L GoLytely given at 17:00 today and an additional 2L at 05:00 tomorrow  - Clear liquid diet for now  - NPO past MN except medications with sips of water (and remainining 2L GoLytely at 05:00 am as mentioned above)      # Abnormal LT  mild elevation in TBili to 1.6 and   NO biliary ductal dilation on CT  CT A/P: Diffusely heterogeneously enhancing liver and diffuse periportal edema. Left hepatic lobe hypertrophy Diffusely edematous gallbladder. Findings are nonspecific, and can be seen in portal hypertension, fluid overload, or sarcoidosis given history. No biliary ductal dilatation. Multiple enlarged upper abdominal and retroperitoneal lymph nodes, of unclear etiology.  MRI: diffuse heterogenosity of liver suggestive of hemochromatosis. No definite space-occupying mass identified, but an infiltrative lesion cannot be completely excluded.  - pendiong IR guided liver bx  - Monitor LT    Recommendations discussed with primary team  Plan discussed with GI service attending    Bigg Clarke MD  PGY-4 GI fellow  Pager: [phone]      
50F w/ a PMH significant for anemia requiring frequent transfusions, sarcoidosis, gastric ulcers, asthma, sickle cell trait, presenting with a hemoglobin of 6.0, dyspnea on exertion x2 days. Reports initially MALIK was attributed to excessive menstrual losses and had been on IV iron therapy in the past. Last Colonoscopy 2019 ([facility]) reportedly wnl. Reports last EGD in 2018 with gastritis and bleeding ulcer. Also endorsed unintentional weight loss, constipation, decreased appetite, as well as history of multiple different family cancers. Reported 1 episode of black formed stool however FOBT negative. Hemoglobin 6, MCV 64.4, ferritin 4, TIBC 477, transferrin 459. HD stable, Hb improved from 6-->6.7 with 1pRBC.     # microcytic anemia  Iron studies consistent with severe MALIK and hypoproliferative BM based on low retic index  Hb improved from 6-->8.2 with 1 pRBC and IV iron  - obtain colonoscopy report from [facility]  - monitor CBC and transfuse to goal H/H  - Possible EGD/colonoscopy on Friday    # Abnormal LT  mild elevation in TBili to 1.6 and   NO biliary ductal dilation on CT  CT A/P: Diffusely heterogeneously enhancing liver and diffuse periportal edema. Left hepatic lobe hypertrophy Diffusely edematous gallbladder. Findings are nonspecific, and can be seen in portal hypertension, fluid overload, or sarcoidosis given history. No biliary ductal dilatation. Multiple enlarged upper abdominal and retroperitoneal lymph nodes, of unclear etiology.  MRI: diffuse heterogenosity of liver suggestive of hemochromatosis. No definite space-occupying mass identified, but an infiltrative lesion cannot be completely excluded.  - Given no definite liver lesion identified yet a diagnosis of infiltrative lesion cannot be excluded:  IR guided liver bx  - Monitor LT    Recommendations discussed with primary team  Plan discussed with GI service attending    Bigg Clarke MD  PGY-4 GI fellow  Pager: [phone]      
Patient is a 50 year old female with pmhx of anemia requiring frequent transfusions sarcoid, gastritis, gastric ulcers, asthma, chronic anemia, sickle cell trait, presenting with dyspnea on exertion, 30 pound weight loss, constipation, found to be hemodynamically stable with a hemoglobin of 6.0, bilirubin of 1.7, and alk phos of 724, admitted for treatment of symptomatic anemia.
Patient is a 50 year old female with pmhx of anemia requiring frequent transfusions sarcoid, gastritis, gastric ulcers, asthma, chronic anemia, sickle cell trait, presenting with dyspnea on exertion, 30 pound weight loss, constipation, found to be hemodynamically stable with a hemoglobin of 6.0, bilirubin of 1.7, and alk phos of 724, admitted for treatment of symptomatic anemia.

## 2020-11-12 NOTE — DISCHARGE NOTE PROVIDER - NSDCFUSCHEDAPPT_GEN_ALL_CORE_FT
AMOS CAREY ; 11/20/2020 ; NPP Rheum [address] [facility] ; 11/20/2020 ; NPP Rheum [address] [facility] ; 11/24/2020 ; NPP Gastro [address]

## 2020-11-12 NOTE — PROGRESS NOTE ADULT - SUBJECTIVE AND OBJECTIVE BOX
50 year old female presents with anemia.   Patient has a history of sarcoidosis.     Interval Events  - Feels better  - Biopsy not done today     HPI:   In 2009 she presented to the hospital after having lost 130lbs. She underwent a lymph node biopsy which confirmed sarcoidosis. She states she also had sarcoid in her liver at that time - scleral icterus and elevated LFTs. Was started on steroids and states she felt better. 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 Not sure who managed her sarcoidosis at this time, but states she has been off steroids since 2018.   She now presents after being found anemic at an outpatient appointment. History of weekly transfusions for her anemia, last in 2018. She has a bleeding disorder but does not know which one? History of metmenorrhagia. Currently symptomatic with SOB, and potential syncopal episode. She states she has never had a bone marrow biopsy.     Data reviewed, notable for:   CBC with anemia   BMP WNL   LFTs with alk phos 650   Iron studies c/w blood loss   1,25OHD WNL   ACE WNL  FOBT negative  CT a/p with multiple enlarged LN and an enlarged screen   MRI abdomen c/w inflammatory LAD and changes in the liver which could be c/w sarcoidosis  CT chest high resolution no sarcoidosis

## 2020-11-12 NOTE — DISCHARGE NOTE PROVIDER - PROVIDER TOKENS
PROVIDER:[TOKEN:[[id number]],FOLLOWUP:[2 weeks]] FREE:[LAST:[Queenie],FIRST:[Red LEE],PHONE:[phone]],FAX:[phone]],ADDRESS:[RHEUMATOLOGY, INTERNAL MEDICINE  [address] [place] [address], [place] [address]],SCHEDULEDAPPT:[11/20/2020],SCHEDULEDAPPTTIME:[12:20 PM]] FREE:[LAST:[Queenie],FIRST:[Red LEE],PHONE:[phone]],FAX:[phone]],ADDRESS:[RHEUMATOLOGY, INTERNAL MEDICINE  [address] [place] [address], [place] [address]],SCHEDULEDAPPT:[11/20/2020],SCHEDULEDAPPTTIME:[12:20 PM]],FREE:[LAST:[],FIRST:[Ata MCCALL],PHONE:[phone]],FAX:[phone]],ADDRESS:[MEDICAL ONCOLOGY  [address] [place] [address]],SCHEDULEDAPPT:[11/18/2020],SCHEDULEDAPPTTIME:[09:00 AM]] FREE:[LAST:[Queenie],FIRST:[Red LEE],PHONE:[phone]],FAX:[phone]],ADDRESS:[RHEUMATOLOGY, INTERNAL MEDICINE  [address] [place] [address], [place] [address]],SCHEDULEDAPPT:[11/20/2020],SCHEDULEDAPPTTIME:[12:20 PM]],FREE:[LAST:[],FIRST:[Ata MCCALL],PHONE:[phone]],FAX:[phone]],ADDRESS:[MEDICAL ONCOLOGY  [address] [place] [address]],SCHEDULEDAPPT:[11/18/2020],SCHEDULEDAPPTTIME:[09:00 AM]],PROVIDER:[TOKEN:[[id number]],FOLLOWUP:[2 weeks]]

## 2020-11-12 NOTE — DISCHARGE NOTE PROVIDER - NSDCCPCAREPLAN_GEN_ALL_CORE_FT
PRINCIPAL DISCHARGE DIAGNOSIS  Diagnosis: Symptomatic anemia  Assessment and Plan of Treatment: Anemia is the medical term for when a person has too few red blood cells. Red blood cells are the cells in your blood that carry oxygen. If you have too few red blood cells, your body does not get all the oxygen it needs. Most people with anemia have no symptoms. They find out they have it after their doctor does blood tests for another reason. People who do have symptoms might feel tired or weak, especially if they try to exercise or have headaches. If you experience these symptoms you should see your primary care provider for further evaluation. You were admitted to the hospital due to outpatient lab findings of low hemoglobin of 6. You received blood transfusion in the hospital, now hemoglobin is stable above 8. You also received iron infusion, which helps increase your red blood cell production. Please follow up with your PCP for iron supplement after discharge        SECONDARY DISCHARGE DIAGNOSES  Diagnosis: Sarcoidosis  Assessment and Plan of Treatment: You had a history of sarcoidosis, although it was unsuccessful for us to obtain your medical record to confirm the diagnosis. CT chest scan did now show any significant findings. You underwent liver biopsy during this hospitalization, please follow up with Dr. Jerome to discuss the results.

## 2020-11-12 NOTE — DISCHARGE NOTE NURSING/CASE MANAGEMENT/SOCIAL WORK - NSDCPEWEB_GEN_ALL_CORE
[place] website --- [email or web address] for Tobacco [email or web address] website --- [email or web address]

## 2020-11-12 NOTE — PROGRESS NOTE ADULT - ATTENDING COMMENTS
Patient was seen and examined with the resident team today.  I agree with Dr. Rodriguez's assessment and plan with the following exceptions/additions:     Briefly, this is a 49yo woman with a PMH of hepatic and pulmonary sarcoid (dx on LN bx, [place], 2008/2009), gastritis c/b PUD, asthma, chronic anemia NOS (last transfusion early 2020) and sickle cell trait who presented with outpatient labs c/f symptomatic anemia (Hb 6).  Constellation of symptoms c/f fatigue, early satiety, hot flashes and "bone pain" consistent w/prior Sarcoid flares.  Denies any abnormal bleeding in the last year.  Cancer screening relatively up to date; however, given 4 immediate members with a hx of malignancy, is due for her q3yr colonoscopy.  Labs thus far remarkable for hypercalcemia, elevated ALP and profound MALIK w/a RI of 0.45.      -- would discuss with Rheum possibility of hematological manifestation of a sarcoid flare  -- chest CT pending   -- GI, RE: profound MALIK i/s/o 4 immediate family members w/cancer, also reports of dark stools and hx of PUD  -- transfuse to Hb goal >7   -- agree with Shandra and Parvo per Heme  -- please review smear  -- replete w/IV iron   -- please check TFT's   -- chest CT pending  -- DVT PPx - SCDs  -- Dispo - home once Hb stable    Yas Hager  [phone]
Seen/discussed with fellow on 11/12  Agree with above

## 2020-11-12 NOTE — DISCHARGE NOTE NURSING/CASE MANAGEMENT/SOCIAL WORK - NSDCFUADDAPPT_GEN_ALL_CORE_FT
Please follow up with your Oncology Provider, Dr. Ata Taylor at [address] [place] [address] on 11/18/2020 at 9:00am.    Please follow up with your Rheumatology Provider, Dr. Red Jerome at [address] [place] [address], [address], [place] [address] on 11/20/2020 at 12:20pm.    Please follow up with Gastroenterology Dr. Karly Combs on 11/24/2020 @ 9AM  [address] [place] [address] floor  [phone]    Please bring your Insurance card(s), photo ID and discharge paperwork to your appointment.    Appointments were scheduled by Ms. SALUD Houston, Referral Coordinator.

## 2020-11-12 NOTE — PROGRESS NOTE ADULT - SUBJECTIVE AND OBJECTIVE BOX
GASTROENTEROLOGY PROGRESS NOTE  Patient seen and examined at bedside. No new complaints. Pending IR bx today    PERTINENT REVIEW OF SYSTEMS:  As noted above    Allergies    penicillin (Anaphylaxis)  Tomatoes (Pruritus)    Intolerances      MEDICATIONS:  MEDICATIONS  (STANDING):  influenza   Vaccine 0.5 milliLiter(s) IntraMuscular once  iron sucrose IVPB 200 milliGRAM(s) IV Intermittent every 24 hours  pantoprazole  Injectable 40 milliGRAM(s) IV Push every 12 hours    MEDICATIONS  (PRN):  ALBUTerol    90 MICROgram(s) HFA Inhaler 2 Puff(s) Inhalation every 6 hours PRN Shortness of Breath and/or Wheezing    Vital Signs Last 24 Hrs  T(C): 36.8 (12 Nov 2020 12:00), Max: 36.9 (12 Nov 2020 06:10)  T(F): 98.2 (12 Nov 2020 12:00), Max: 98.5 (12 Nov 2020 06:10)  HR: 65 (12 Nov 2020 12:00) (65 - 78)  BP: 132/78 (12 Nov 2020 12:00) (119/66 - 136/86)  BP(mean): --  RR: 16 (12 Nov 2020 12:00) (16 - 18)  SpO2: 97% (12 Nov 2020 12:00) (97% - 100%)    PHYSICAL EXAM:    General: Well developed; well nourished; in no acute distress  HEENT: MMM, conjunctiva and sclera clear  Gastrointestinal: Soft non-tender non-distended;  No rebound or guarding  Skin: Warm and dry. No obvious rash    LABS:                        8.4    6.46  )-----------( 222      ( 12 Nov 2020 06:44 )             29.6     11-12    134<L>  |  102  |  13  ----------------------------<  88  3.9   |  21<L>  |  0.95    Ca    8.4      12 Nov 2020 06:44  Phos  3.1     11-12  Mg     2.0     11-12    TPro  7.1  /  Alb  3.2<L>  /  TBili  1.8<H>  /  DBili  x   /  AST  63<H>  /  ALT  42  /  AlkPhos  726<H>  11-12                      RADIOLOGY & ADDITIONAL STUDIES:  Reviewed

## 2020-11-12 NOTE — DISCHARGE NOTE PROVIDER - HOSPITAL COURSE
#Discharge: do not delete    Patient is a 50 year old female with pmhx of anemia requiring frequent transfusions sarcoid, gastritis, gastric ulcers, asthma, chronic anemia, sickle cell trait, presenting with dyspnea on exertion, 30 pound weight loss, constipation, found to be hemodynamically stable with a hemoglobin of 6.0, admitted for treatment of symptomatic anemia.     Problem List/Main Diagnoses:         Inpatient treatment course:     New medications: None    Labs to be followed outpatient: Liver biopsy    Exam to be followed outpatient: None   #Discharge: do not delete    Patient is a 50 year old female with pmhx of anemia requiring frequent transfusions sarcoid, gastritis, gastric ulcers, asthma, chronic anemia, sickle cell trait, presenting with dyspnea on exertion, 30 pound weight loss, constipation, found to be hemodynamically stable with a hemoglobin of 6.0, admitted for treatment of symptomatic anemia.     Problem List/Main Diagnoses:   # Anemia  Patient with long history of anemia, presents with a hemoglobin of 6.0, dyspnea on exertion x2 days, and 1 episode of black formed stool, with iron studies, MCV, and reticulocyte count indicating a hypoproliferative microcytic iron deficiency anemia. Cause likely due to non-adherence to iron infusions, perhaps compounded by sickle cell trait. Singular episode of black stool likely related iron supplements. Patient has hx of ulcer however unlikely to be actively bleeding given negative FOBT, lack of hematemesis, melena, or hematochezia. EGD was not done inpatient, will arrange for outpatient followup.   -s/p 2u pRBC, now hgb stable at 8  -GI consulted, no urgent EGD down inpatient, will follow up outpatient  -continue protonix 40mg IV BID  -B12, folate wnl    # Sarcoid  Patient with history of sarcoidosis and poor follow, up. Diagnosed at [facility] in 2009. Attempted to obtain collateral from [facility] but they were unable to provide patient's records.   -CT A/P with hepatic hypodensities that are nonspecific but consistent with sarcoidosis.   -Rheum consulted, will follow up with Dr. Jerome to initiate steroid therapy.   -CT chest no acute finding.   -sent ACE, 1,25OHD, 25OHD, ionized calcium, quantitative immunoglobulins    # Elevated LFTs.    Patient presents with elevated a phos, ggt, as bilirubin with direct bili predominance, indicating a cholestatic picture. DDx for intrahepatic can include sarcoidosis given patient history. Hepatic malignancy must be considered as well given history of extensive alcohol use. Metastatic malignancy as well as malignancy resulting in extrahepatic cholestasis must also be ruled out given constitutional symptoms the patient has been experiencing. Obstruction can also be a cause of elevated alk phos given discomfort during exam, although less likely given pawel tenderness as well as lack of history of abdominal pain.  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 However given constitutional symptoms, workup for monoclonal gammopathy must be worked up with SPEP/MGUS to assess for additional malignancy  -Kappa/Lamda 1.7  -f/u onc outpatient      Inpatient treatment course:   Patient was admitted for symptomatic anemia with hgb6, s/p 2uprbc with adequate response. CT A/P showed multiple poorly marginated irregular hypodensities are seen throughout both lobes of the liver. CT chest negative Ab MRI showed diminished T2 signal at the spleen, with diffuse heterogeneous diminished T2 signal at the liver suggestive of hemochromatosis. Diffuse heterogeneous signal throughout the liver with scattered geographic somewhat linear areas. Pt was unable to receive EGD or liver biopsy inpatient, will pursue outpatient diagnostic testing.     New medications: Protonix    Labs to be followed outpatient: Liver biopsy    Exam to be followed outpatient: None   #Discharge: do not delete    Patient is a 50 year old female with pmhx of anemia requiring frequent transfusions sarcoid, gastritis, gastric ulcers, asthma, chronic anemia, sickle cell trait, presenting with dyspnea on exertion, 30 pound weight loss, constipation, found to be hemodynamically stable with a hemoglobin of 6.0, admitted for treatment of symptomatic anemia.     Problem List/Main Diagnoses:   # Anemia  Patient with long history of anemia, presents with a hemoglobin of 6.0, dyspnea on exertion x2 days, and 1 episode of black formed stool, with iron studies, MCV, and reticulocyte count indicating a hypoproliferative microcytic iron deficiency anemia. Cause likely due to non-adherence to iron infusions, perhaps compounded by sickle cell trait. Singular episode of black stool likely related iron supplements. Patient has hx of ulcer however unlikely to be actively bleeding given negative FOBT, lack of hematemesis, melena, or hematochezia. EGD was not done inpatient, will arrange for outpatient followup.   -s/p 2u pRBC, now hgb stable at 8  -GI consulted, no urgent EGD down inpatient, will follow up outpatient  -continue protonix 40mg IV BID  -B12, folate wnl    # Sarcoid  Patient with history of sarcoidosis and poor follow, up. Diagnosed at [facility] in 2009. Attempted to obtain collateral from [facility] but they were unable to provide patient's records.   -CT A/P with hepatic hypodensities that are nonspecific but consistent with sarcoidosis.   -Rheum consulted, will follow up with Dr. Jerome to initiate steroid therapy.   -CT chest no acute finding.   -sent ACE, 1,25OHD, 25OHD, ionized calcium, quantitative immunoglobulins    # Elevated LFTs.    Patient presents with elevated a phos, ggt, as bilirubin with direct bili predominance, indicating a cholestatic picture. DDx for intrahepatic can include sarcoidosis given patient history. Hepatic malignancy must be considered as well given history of extensive alcohol use. Metastatic malignancy as well as malignancy resulting in extrahepatic cholestasis must also be ruled out given constitutional symptoms the patient has been experiencing. Obstruction can also be a cause of elevated alk phos given discomfort during exam, although less likely given pawel tenderness as well as lack of history of abdominal pain.  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 However given constitutional symptoms, workup for monoclonal gammopathy must be worked up with SPEP/MGUS to assess for additional malignancy  -Kappa/Lamda 1.7  -f/u onc outpatient      Inpatient treatment course:   Patient was admitted for symptomatic anemia with hgb6, s/p 2uprbc with adequate response. CT A/P showed multiple poorly marginated irregular hypodensities are seen throughout both lobes of the liver c/f sarcoid. CT chest negative. Pt was unable to receive EGD or liver biopsy inpatient, will pursue outpatient diagnostic testing as patient unwilling to wait further in house. Will be started on empiric steroid course per rheumatology and will f/u with clinic within 1 week to discuss next steps. GI no longer wanting EGD in house as pt with resolution of dark stool and with brown/normal stool and stable Hg, outpt colo/EGD planned and pt in agreement with plan, wanting to be home today.     New medications: Protonix and prednisone     Labs to be followed outpatient: Liver biopsy    Exam to be followed outpatient: None

## 2020-11-12 NOTE — DISCHARGE NOTE PROVIDER - CARE PROVIDER_API CALL
Red Jerome)  Internal Medicine  [address], [address]  [place], [place] [address]  Phone: [phone]  Fax: [phone]  Follow Up Time: 2 weeks   Red Jerome  RHEUMATOLOGY, INTERNAL MEDICINE  [address], [address]  [place], [place] [address]  Phone: [phone]  Fax: [phone]  Scheduled Appointment: 11/20/2020 12:20 PM   Red Jerome  RHEUMATOLOGY, INTERNAL MEDICINE  [address], [address]  [place] [address]  Phone: [phone]  Fax: [phone]  Scheduled Appointment: 11/20/2020 12:20 PM    Ata Taylor  MEDICAL ONCOLOGY  [address] [place] [address]  Phone: [phone]  Fax: [phone]  Scheduled Appointment: 11/18/2020 09:00 AM   Red Jerome  RHEUMATOLOGY, INTERNAL MEDICINE  [address], [address]  [place] [address]  Phone: [phone]  Fax: [phone]  Scheduled Appointment: 11/20/2020 12:20 PM    Ata Taylor  MEDICAL ONCOLOGY  [address] [place] [address]  Phone: [phone]  Fax: [phone]  Scheduled Appointment: 11/18/2020 09:00 AM    Timothy Nicole  GASTROENTEROLOGY  [address] [place] [address]  Phone: [phone]  Fax: [phone]  Follow Up Time: 2 weeks

## 2020-11-12 NOTE — DISCHARGE NOTE PROVIDER - CARE PROVIDERS DIRECT ADDRESSES
[email or web address] ,DirectAddress_Unknown ,DirectAddress_Unknown,DirectAddress_Unknown ,DirectAddress_Unknown,DirectAddress_Unknown,[email or web address]

## 2020-11-12 NOTE — DISCHARGE NOTE NURSING/CASE MANAGEMENT/SOCIAL WORK - PATIENT PORTAL LINK FT
You can access the FollowMyHealth Patient Portal offered by [facility] by registering at the following website: [email or web address]. By joining [facility]’s FollowMyHealth portal, you will also be able to view your health information using other applications (apps) compatible with our system.

## 2020-11-12 NOTE — DISCHARGE NOTE PROVIDER - NSDCMRMEDTOKEN_GEN_ALL_CORE_FT
Albuterol (Eqv-ProAir HFA) 90 mcg/inh inhalation aerosol: 2 puff(s) inhaled every 6 hours, As Needed  famotidine 20 mg oral tablet: 1 tab(s) orally 2 times a day  ferrous gluconate 324 mg (38 mg elemental iron) oral tablet: 1 tab(s) orally 3 times a day   famotidine 20 mg oral tablet: 1 tab(s) orally 2 times a day  ferrous gluconate 324 mg (38 mg elemental iron) oral tablet: 1 tab(s) orally 3 times a day  predniSONE 50 mg oral tablet: 1 tab(s) orally once a day   Protonix 40 mg oral delayed release tablet: 1 tab(s) orally 2 times a day

## 2020-11-12 NOTE — PROGRESS NOTE ADULT - SUBJECTIVE AND OBJECTIVE BOX
Patient seen and examined. Agree with resident's findings, assessment and plan. Agree with discharge diagnoses, as well as with plan of care and goals.  Time spent: 35 minutes for evaluation, plan of care, patient education, medication reconciliation, coordination of care, follow ups and transition to outpatient.     PE: AOx3, lungs clear, CV RRR, abd soft, nt ext wwp no sig le edema. labs/imaging reviewed- hg stable    50 year old female with pmhx of anemia requiring frequent transfusions sarcoid, gastritis, gastric ulcers, asthma, chronic anemia, sickle cell trait, presenting with dyspnea on exertion, 30 pound weight loss found to be markedly anemic to 6 presumed to be from bleeding ulcer now resolved, largely weightloss related to sarcoid likely with liver involvement, lower c/f for malignancy  -IR liver biopsy and rheum followup on dc to begin steriods pending biopsy results  -GI deferred endoscopy for PUD with bleeding given normal brown stool/stable hg now on PPI. dc on ppi bid and will need outpt GI f/u mychal given marked iron deficiency anemia and wieghtloss to get egd/colo done  -Marked iron deficiency anemia, s/p IV iron here, dc on oral iron       Davin Pratt MD [phone]

## 2020-11-12 NOTE — DISCHARGE NOTE PROVIDER - NSDCFUADDAPPT_GEN_ALL_CORE_FT
Please follow up with your Rheumatology Provider, Dr. Red Jerome at [address], [address], [place] [address] on 11/20/2020 at 12:20pm.    Please bring your Insurance card(s), photo ID and discharge paperwork to your appointment.    Appointment was scheduled by Ms. SALUD Houston, Referral Coordinator. Please follow up with your Oncology Provider, Dr. Ata Taylor at [address] [place] [address] on 11/18/2020 at 9:00am.    Please follow up with your Rheumatology Provider, Dr. Red Jerome at [address] [place] [address], [place] [address] on 11/20/2020 at 12:20pm.    Please bring your Insurance card(s), photo ID and discharge paperwork to your appointment.    Appointments were scheduled by Ms. SALUD Houston, Referral Coordinator. Please follow up with your Oncology Provider, Dr. Ata Taylor at [address] [place] [address] on 11/18/2020 at 9:00am.    Please follow up with your Rheumatology Provider, Dr. Red Jerome at [address] [place] [address], [address] [place] [address] on 11/20/2020 at 12:20pm.    Please call Dr. Nicole office to make an appointment [phone]    Please bring your Insurance card(s), photo ID and discharge paperwork to your appointment.    Appointments were scheduled by Ms. SALUD Houston, Referral Coordinator. Please follow up with your Oncology Provider, Dr. Ata Taylor at [address] [place] [address] on 11/18/2020 at 9:00am.    Please follow up with your Rheumatology Provider, Dr. Red Jerome at [address] [place] [address], [address], [place] [address] on 11/20/2020 at 12:20pm.    Please follow up with Gastroenterology Dr. Karly Combs on 11/24/2020 @ 9AM  [address] [place] [address] floor  [phone]    Please bring your Insurance card(s), photo ID and discharge paperwork to your appointment.    Appointments were scheduled by Ms. SALUD Houston, Referral Coordinator.

## 2020-11-12 NOTE — PROGRESS NOTE ADULT - SUBJECTIVE AND OBJECTIVE BOX
LENGTH OF HOSPITAL STAY: 3d    SUBJECTIVE: No acute overnight events    HISTORY OF PRESENTING ILLNESS:   49 y/o F w/ PMHx gastritis, gastric ulcers, asthma, chronic anemia, sickle cell trait, sarcoid, presenting with after being found anemic at an outpatient appointment. Patient reports a 2 day history of shortness of breath on exertion as well as left leg extending from her foot to her knee. She endorses 2 falls in the past weeks, the first of which occurred while she was getting out of bed. She states at that time she felt she got up too quickly and became lightheaded and fell. Denies any head trauma, palpitations, or LOC. The second fall occurred when she was getting off the couch and felt like her leg felt went numb and gave out on her. Denies any LOC, but endorses feeling her heart rate increase. She has had the numbness and tingling in her left leg for about 3 weeks. Patient reports 1 black formed stool 2 days prior to admission. Denies any hemoptysis, hematemesis or vaginal bleeding.    Patient has a history of bleeding disorder and typically requires a blood transfusion every 3-4x month, however while she was menstruating she would sometimes require them 1-2x a month. Her most recent transfusion was in 2020. At that time she reports passing out on the street and waking up in the hospital. She is supposed to receive an iron transfusion every week, however she last received one in April. 2 years ago was admitted for hematemesis, at that time she had an endoscopy performed, was given a transfusion, and diagnosed with an ulcer. Most recent CBC in mid-October showed hemoglobin around 9.5. Patient has previously had extensive workup done for anemia and has never been given a diagnosis. Of note, her mother had an unknown bleeding disorder as well.    In  she was diagnosed with sarcoid after being admitted to the hospital for a 130lb weight loss of 1 months time. Patient notes that at that time she was drinking heavily and doing a lot of cocaine. She was treated with steroids and a pill to improve her appetite. At that time she reports having scleral icterus and elevated LFTs, which have remained high. She has since stopped taking that pill and has started smoking 1 joint of marijuana daily in its place.    ED Course: Temp 97.9, HR 94, /79, RR 16, saturating 96% on RA. Hemoglobin 6.0, WBC 5.75, platelets 303; MCV 64.6, ferritin 4, serum iron 11, TIBC 466; alk phos 724, bilirubin 1.7, AST/ALT 57/40; ordered for 1 unit PRBC (2020 11:15)    PAST MEDICAL & SURGICAL HISTORY:  Chronic anemia  Asthma  Gastric ulcer  Gastritis  Sickle-cell trait  Anemia  Sarcoid  Acid reflux disease with ulcer  Chronic GERD  Asthma  H/O  section    ALLERGIES:  penicillin (Anaphylaxis)  Tomatoes (Pruritus)    MEDICATIONS:  STANDING MEDICATIONS  influenza   Vaccine 0.5 milliLiter(s) IntraMuscular once  iron sucrose IVPB 200 milliGRAM(s) IV Intermittent every 24 hours  pantoprazole  Injectable 40 milliGRAM(s) IV Push every 12 hours    PRN MEDICATIONS  ALBUTerol    90 MICROgram(s) HFA Inhaler 2 Puff(s) Inhalation every 6 hours PRN    VITALS:   T(F): 98.5  HR: 71  BP: 133/89  RR: 17  SpO2: 98%    LABS:                        8.4    6.46  )-----------( 222      ( 2020 06:44 )             29.6         134<L>  |  102  |  13  ----------------------------<  88  3.9   |  21<L>  |  0.95    Ca    8.4      2020 06:44  Phos  3.1       Mg     2.0         TPro  7.1  /  Alb  3.2<L>  /  TBili  1.8<H>  /  DBili  x   /  AST  63<H>  /  ALT  42  /  AlkPhos  726<H>      PHYSICAL EXAM:  GEN: No acute distress  HEENT: NCAT  LUNGS: Clear to auscultation bilaterally   HEART: S1/S2 present. RRR.   ABD: Soft, non-tender, non-distended. Bowel sounds present  EXT: No pitting edema  NEURO: AAOX3

## 2020-11-13 LAB
24R-OH-CALCIDIOL SERPL-MCNC: 12 NG/ML — LOW (ref 30–80)
CA-I BLD-SCNC: 1.21 MMOL/L — SIGNIFICANT CHANGE UP (ref 1.12–1.3)
COPPER SERPL-MCNC: 282 UG/DL — HIGH (ref 72–166)
IGA FLD-MCNC: 477 MG/DL — SIGNIFICANT CHANGE UP (ref 84–499)
IGD SER-MCNC: <1 MG/DL — SIGNIFICANT CHANGE UP
IGG FLD-MCNC: 2179 MG/DL — HIGH (ref 610–1660)
IGM SERPL-MCNC: 89 MG/DL — SIGNIFICANT CHANGE UP (ref 35–242)
KAPPA LC SER QL IFE: 6.37 MG/DL — HIGH (ref 0.33–1.94)
KAPPA/LAMBDA FREE LIGHT CHAIN RATIO, SERUM: 1.9 RATIO — HIGH (ref 0.26–1.65)
LAMBDA LC SER QL IFE: 3.35 MG/DL — HIGH (ref 0.57–2.63)

## 2020-11-15 LAB — IGE SERPL-ACNC: 8 KU/L — SIGNIFICANT CHANGE UP

## 2020-11-16 LAB
B19V IGG SER-ACNC: 0.44 INDEX — SIGNIFICANT CHANGE UP (ref 0–0.9)
B19V IGG+IGM SER-IMP: NEGATIVE — SIGNIFICANT CHANGE UP
B19V IGG+IGM SER-IMP: SIGNIFICANT CHANGE UP
B19V IGM FLD-ACNC: 0.31 INDEX — SIGNIFICANT CHANGE UP (ref 0–0.9)
B19V IGM SER-ACNC: NEGATIVE — SIGNIFICANT CHANGE UP

## 2020-11-17 DIAGNOSIS — K59.00 CONSTIPATION, UNSPECIFIED: ICD-10-CM

## 2020-11-17 DIAGNOSIS — K21.9 GASTRO-ESOPHAGEAL REFLUX DISEASE WITHOUT ESOPHAGITIS: ICD-10-CM

## 2020-11-17 DIAGNOSIS — Z80.1 FAMILY HISTORY OF MALIGNANT NEOPLASM OF TRACHEA, BRONCHUS AND LUNG: ICD-10-CM

## 2020-11-17 DIAGNOSIS — Z80.51 FAMILY HISTORY OF MALIGNANT NEOPLASM OF KIDNEY: ICD-10-CM

## 2020-11-17 DIAGNOSIS — Z88.0 ALLERGY STATUS TO PENICILLIN: ICD-10-CM

## 2020-11-17 DIAGNOSIS — J45.909 UNSPECIFIED ASTHMA, UNCOMPLICATED: ICD-10-CM

## 2020-11-17 DIAGNOSIS — Z80.3 FAMILY HISTORY OF MALIGNANT NEOPLASM OF BREAST: ICD-10-CM

## 2020-11-17 DIAGNOSIS — Z87.891 PERSONAL HISTORY OF NICOTINE DEPENDENCE: ICD-10-CM

## 2020-11-17 DIAGNOSIS — D86.89 SARCOIDOSIS OF OTHER SITES: ICD-10-CM

## 2020-11-17 DIAGNOSIS — D57.3 SICKLE-CELL TRAIT: ICD-10-CM

## 2020-11-17 DIAGNOSIS — D50.9 IRON DEFICIENCY ANEMIA, UNSPECIFIED: ICD-10-CM

## 2020-11-17 DIAGNOSIS — R63.4 ABNORMAL WEIGHT LOSS: ICD-10-CM

## 2020-11-20 ENCOUNTER — APPOINTMENT (OUTPATIENT)
Dept: RHEUMATOLOGY | Facility: CLINIC | Age: 50
End: 2020-11-20
Payer: MEDICARE

## 2020-11-20 VITALS
BODY MASS INDEX: 26.06 KG/M2 | HEART RATE: 80 BPM | HEIGHT: 61 IN | DIASTOLIC BLOOD PRESSURE: 83 MMHG | SYSTOLIC BLOOD PRESSURE: 138 MMHG | TEMPERATURE: 98.3 F | OXYGEN SATURATION: 97 % | WEIGHT: 138 LBS

## 2020-11-20 PROCEDURE — 36415 COLL VENOUS BLD VENIPUNCTURE: CPT

## 2020-11-20 PROCEDURE — 99205 OFFICE O/P NEW HI 60 MIN: CPT | Mod: 25

## 2020-11-20 RX ORDER — PREDNISONE 20 MG/1
20 TABLET ORAL DAILY
Qty: 30 | Refills: 2 | Status: ACTIVE | COMMUNITY
Start: 2020-11-20 | End: 1900-01-01

## 2020-11-23 NOTE — HISTORY OF PRESENT ILLNESS
[Dry Eyes] : dry eyes [FreeTextEntry1] : 50 year old female with previously diagnosed abdominal and pulmonary sarcoid diagnosed with a lymph node biopsy presents for follow up evaluation. Recent hospitalization for anemia and a sarcoidosis flare. \par \par In 2009 she presented to an outside hospital after having lost 130lbs. She underwent a lymph node biopsy which confirmed sarcoidosis. She states she also had sarcoid in her liver at that time - scleral icterus and elevated LFTs. Was started on steroids and states she felt better. 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 Not sure who managed her sarcoidosis at this time, but states she has been off steroids since 2018. She now presents after being found anemic at an outpatient appointment. History of weekly transfusions for her anemia, last in 2018. She has a bleeding disorder but does not know which one? History of metmenorrhagia. Currently symptomatic with SOB, and potential syncopal episode. She states she has never had a bone marrow biopsy. \par Given her history of abdominal sarcoid, suspect that this could be active again. She further has what appears to be a resolving EN rash. Though it would have been preferable to obtain a biopsy to ensure that her LFT rise due to sarcoidosis, this was not done due to some issues during her hospitalization. Since the inflammatory changes seen on MRI were consistent with her history of sarcoidosis, we decided empirically with steroids and obtain follow up imaging. Treated with Prednisone 50mg daily. \par \par Office Visit 11/23/20:\par On prednisone 50mg daily since discharge\par Feels better\par More energy \par Less pain in her thighs \par No abdominal pain / gas \par No diarrhea or bloody stools\par  [Anorexia] : no anorexia [Fever] : no fever [Chills] : no chills [Fatigue] : no fatigue [Skin Lesions] : no lesions [Skin Nodules] : no skin nodules [Oral Ulcers] : no oral ulcers [Cough] : no cough [Dry Mouth] : no dry mouth [Dysphagia] : no dysphagia [Shortness of Breath] : no shortness of breath [Chest Pain] : no chest pain [Falls] : no falls [Difficulty Standing] : no difficulty standing [Difficulty Walking] : no difficulty walking [Eye Pain] : no eye pain [Eye Redness] : no eye redness

## 2020-11-23 NOTE — PHYSICAL EXAM
[General Appearance - Alert] : alert [General Appearance - In No Acute Distress] : in no acute distress [General Appearance - Well Nourished] : well nourished [General Appearance - Well Developed] : well developed [Sclera] : the sclera and conjunctiva were normal [Outer Ear] : the ears and nose were normal in appearance [Examination Of The Oral Cavity] : the lips and gums were normal [Nasal Cavity] : the nasal mucosa and septum were normal [Neck Appearance] : the appearance of the neck was normal [Respiration, Rhythm And Depth] : normal respiratory rhythm and effort [Auscultation Breath Sounds / Voice Sounds] : lungs were clear to auscultation bilaterally [Heart Rate And Rhythm] : heart rate was normal and rhythm regular [Heart Sounds] : normal S1 and S2 [Abdomen Soft] : soft [Abdomen Tenderness] : non-tender [Cervical Lymph Nodes Enlarged Anterior Bilaterally] : anterior cervical [No Spinal Tenderness] : no spinal tenderness [Musculoskeletal - Swelling] : no joint swelling seen [] : no rash [Oriented To Time, Place, And Person] : oriented to person, place, and time

## 2020-11-23 NOTE — DATA REVIEWED
[FreeTextEntry1] : \par CBC with anemia \par BMP WNL \par LFTs with alk phos 650 \par Iron studies c/w blood loss \par 1,25OHD WNL \par ACE WNL\par FOBT negative\par CT a/p with multiple enlarged LN and an enlarged screen \par MRI abdomen c/w inflammatory LAD and changes in the liver which could be c/w sarcoidosis\par CT chest high resolution no sarcoidosis

## 2020-11-23 NOTE — ASSESSMENT
[FreeTextEntry1] : 50 year old female with previously diagnosed abdominal and pulmonary sarcoid diagnosed with a lymph node biopsy presents for follow up evaluation. Recent hospitalization for anemia and a sarcoidosis flare. \par Doing well \par Continue steroids and re check bloodwork\par Discuss with GI, likely begin TNFi (hepatitis serologies and quantiferon checked when in the hospital)

## 2020-11-24 ENCOUNTER — APPOINTMENT (OUTPATIENT)
Dept: GASTROENTEROLOGY | Facility: CLINIC | Age: 50
End: 2020-11-24

## 2020-11-24 LAB
ALBUMIN SERPL ELPH-MCNC: 3.6 G/DL
ALP BLD-CCNC: 570 U/L
ALT SERPL-CCNC: 76 U/L
ANION GAP SERPL CALC-SCNC: 12 MMOL/L
AST SERPL-CCNC: 55 U/L
BASOPHILS # BLD AUTO: 0.04 K/UL
BASOPHILS NFR BLD AUTO: 0.3 %
BILIRUB SERPL-MCNC: 1.2 MG/DL
BUN SERPL-MCNC: 32 MG/DL
CALCIUM SERPL-MCNC: 9 MG/DL
CHLORIDE SERPL-SCNC: 107 MMOL/L
CO2 SERPL-SCNC: 22 MMOL/L
CREAT SERPL-MCNC: 1.05 MG/DL
CRP SERPL-MCNC: 0.2 MG/DL
EOSINOPHIL # BLD AUTO: 0.08 K/UL
EOSINOPHIL NFR BLD AUTO: 0.6 %
ERYTHROCYTE [SEDIMENTATION RATE] IN BLOOD BY WESTERGREN METHOD: 18 MM/HR
FERRITIN SERPL-MCNC: 97 NG/ML
GGT SERPL-CCNC: 527 U/L
GLUCOSE SERPL-MCNC: 97 MG/DL
HCT VFR BLD CALC: 35.6 %
HGB BLD-MCNC: 9.6 G/DL
IMM GRANULOCYTES NFR BLD AUTO: 0.4 %
LYMPHOCYTES # BLD AUTO: 3.36 K/UL
LYMPHOCYTES NFR BLD AUTO: 25.5 %
MAN DIFF?: NORMAL
MCHC RBC-ENTMCNC: 22.2 PG
MCHC RBC-ENTMCNC: 27 GM/DL
MCV RBC AUTO: 82.4 FL
MONOCYTES # BLD AUTO: 1.01 K/UL
MONOCYTES NFR BLD AUTO: 7.7 %
NEUTROPHILS # BLD AUTO: 8.65 K/UL
NEUTROPHILS NFR BLD AUTO: 65.5 %
PLATELET # BLD AUTO: 209 K/UL
POTASSIUM SERPL-SCNC: 3.6 MMOL/L
PROT SERPL-MCNC: 6.7 G/DL
RBC # BLD: 4.32 M/UL
RBC # FLD: NORMAL
SODIUM SERPL-SCNC: 140 MMOL/L
WBC # FLD AUTO: 13.19 K/UL

## 2020-12-09 ENCOUNTER — NON-APPOINTMENT (OUTPATIENT)
Age: 50
End: 2020-12-09

## 2020-12-14 DIAGNOSIS — D86.9 SARCOIDOSIS, UNSPECIFIED: ICD-10-CM

## 2020-12-14 RX ORDER — PANTOPRAZOLE 40 MG/1
40 TABLET, DELAYED RELEASE ORAL DAILY
Qty: 90 | Refills: 3 | Status: ACTIVE | COMMUNITY
Start: 2020-12-14 | End: 1900-01-01

## 2020-12-29 ENCOUNTER — APPOINTMENT (OUTPATIENT)
Dept: GASTROENTEROLOGY | Facility: CLINIC | Age: 50
End: 2020-12-29

## 2021-01-07 ENCOUNTER — LABORATORY RESULT (OUTPATIENT)
Age: 51
End: 2021-01-07

## 2021-01-07 ENCOUNTER — APPOINTMENT (OUTPATIENT)
Dept: GASTROENTEROLOGY | Facility: CLINIC | Age: 51
End: 2021-01-07
Payer: MEDICARE

## 2021-01-07 VITALS
WEIGHT: 137 LBS | DIASTOLIC BLOOD PRESSURE: 94 MMHG | HEIGHT: 61 IN | TEMPERATURE: 98 F | OXYGEN SATURATION: 97 % | HEART RATE: 73 BPM | BODY MASS INDEX: 25.86 KG/M2 | SYSTOLIC BLOOD PRESSURE: 135 MMHG

## 2021-01-07 DIAGNOSIS — L29.9 PRURITUS, UNSPECIFIED: ICD-10-CM

## 2021-01-07 PROCEDURE — 99072 ADDL SUPL MATRL&STAF TM PHE: CPT

## 2021-01-07 PROCEDURE — 99205 OFFICE O/P NEW HI 60 MIN: CPT | Mod: 25

## 2021-01-07 PROCEDURE — 36415 COLL VENOUS BLD VENIPUNCTURE: CPT

## 2021-01-11 PROBLEM — L29.9 PRURITUS: Status: ACTIVE | Noted: 2021-01-11

## 2021-01-11 NOTE — ASSESSMENT
[FreeTextEntry1] : 51 yo F PMH asthma, sarcoidosis, anemia, bleeding ulcers/gastritis, sickle cell trait who presents after recent hospitalization for sarcoid flare. \par \par Hematemesis episodes, rectal bleeding episodes, melena\par - plan for EGD/Colonoscopy\par \par Abdominal sarcoidosis diagnosis: currently on prednisone 20mg/day \par - recheck liver tests\par - refer for IR guided liver biopsy \par - labwork checking for autoimmune hepatitis, ceruloplasmin\par - check if immune to HAV/HBV otherwise needs to be immunized\par \par Pruritis: currently on benadryl but does not feel this is adequately helping\par - trial of hydroxyzine (can consider next visit), however will need to be counseled to avoid alcohol\par - can consider trial of UDCA for pruritis (however bilis not very high though AP is elevated) \par \par Long-term prednisone use\par - needs bone scan \par - while remains on prednisone will start calcium and vitamin D (patient has not been taking)\par \par Uveitis \par - is seeing opthalmologist, on prednisone eye drops \par \par Follow up post-procedures\par \par

## 2021-01-11 NOTE — PHYSICAL EXAM
[General Appearance - Alert] : alert [General Appearance - In No Acute Distress] : in no acute distress [General Appearance - Well Nourished] : well nourished [General Appearance - Well Developed] : well developed [General Appearance - Well-Appearing] : healthy appearing [Sclera] : the sclera and conjunctiva were normal [PERRL With Normal Accommodation] : pupils were equal in size, round, and reactive to light [Extraocular Movements] : extraocular movements were intact [Outer Ear] : the ears and nose were normal in appearance [Both Tympanic Membranes Were Examined] : both tympanic membranes were normal [Oropharynx] : the oropharynx was normal [Neck Appearance] : the appearance of the neck was normal [Neck Cervical Mass (___cm)] : no neck mass was observed [Jugular Venous Distention Increased] : there was no jugular-venous distention [Thyroid Diffuse Enlargement] : the thyroid was not enlarged [Thyroid Nodule] : there were no palpable thyroid nodules [Auscultation Breath Sounds / Voice Sounds] : lungs were clear to auscultation bilaterally [Heart Rate And Rhythm] : heart rate was normal and rhythm regular [Heart Sounds] : normal S1 and S2 [Heart Sounds Gallop] : no gallops [Murmurs] : no murmurs [Heart Sounds Pericardial Friction Rub] : no pericardial rub [Full Pulse] : the pedal pulses are present [Edema] : there was no peripheral edema [Bowel Sounds] : normal bowel sounds [Abdomen Soft] : soft [Abdomen Tenderness] : non-tender [Abdomen Mass (___ Cm)] : no abdominal mass palpated [Cervical Lymph Nodes Enlarged Posterior Bilaterally] : posterior cervical [Cervical Lymph Nodes Enlarged Anterior Bilaterally] : anterior cervical [No CVA Tenderness] : no ~M costovertebral angle tenderness [No Spinal Tenderness] : no spinal tenderness [Abnormal Walk] : normal gait [Nail Clubbing] : no clubbing  or cyanosis of the fingernails [Musculoskeletal - Swelling] : no joint swelling seen [Motor Tone] : muscle strength and tone were normal [Skin Color & Pigmentation] : normal skin color and pigmentation [Skin Turgor] : normal skin turgor [] : no rash [Sensation] : the sensory exam was normal to light touch and pinprick [No Focal Deficits] : no focal deficits [Oriented To Time, Place, And Person] : oriented to person, place, and time [Impaired Insight] : insight and judgment were intact [Affect] : the affect was normal

## 2021-01-11 NOTE — HISTORY OF PRESENT ILLNESS
[de-identified] : 49 yo F PMH asthma, sarcoidosis, anemia, bleeding ulcers/gastritis, sickle cell trait who presents after recent hospitalization for sarcoid flare. \juan f ba She notes she has been on prednisone on and off since sarcoidosis diagnosis. \juan f ba Was on 40mg 1 yr prior to recent hospitalization. Had not taken prednisone for 1 year prior to hospitalization though. Since being restarted on prednisone in the hospital, is feeling much better. \juan f ba 2020 - hospitalized because anemic. Per patient taking 20mg pred after discharged from hospital. Remains on 20mg prednisone now. Also having leg pain. Pain is always in her legs. Feels like "her bones hurt". Feels like someone is "trying to snatch bones out".  \juan f Had lost a lot of weight (went from 152 lbs to 124 lbs). Now weighs 140s. Has put some weight back on. \juan f Saw Dr. Jerome in follow up. \juan f ba When she returns to Dr. Jerome, plan for liver biopsy per patient.\juan f ba Is not already scheduled for a liver biopsy. \juan f ba Needs a colonoscopy as well. \juan f ba Has had a colonoscopy 3 yrs ago. Was done at [facility]. They didn't find anything. \juan f ba When she was in the hospital recommended to repeat colonoscopy. She is nervous to do prep because has made her throw up blood in the past. \juan f ba Had an EGD 6 yrs ago. She was told she had a lot of inflammation in the stomach. They also thought she had thyroid issues. \juan f ba Drinks a lot of spinach juice, beet juice - not constipated. \juan f ba Was seeing a lung doctor in the past. \juan f ba Has lots of heartburn, all the time, even time when drinking water. \juan f When taking twice a day, she felt better. now that she's on once a day, finds herself taking a pill at night. \juan f Gets nausea if takes too long to eat. \juan f ba Sometimes wakes up with leg pain at night (goes from hips down). Otherwise sometimes wakes up at night just randomly because sehc an't sleep. \juan f ba Gets muscle spasms in back after having a BM. \juan f ba Has Bms 3-4x/day. \par No blood in the stool (before the hospital did have blood in the stool). She was having dark black stools twice before went to the hospital, only happened twice. \par \par That's when she stopped drinking because she started throwing up blood. (she drinks occasionally now). It has been about 3-4 months since she stopped heavy drinking (stopped Sept/Oct). She got tired of being sick and throwing up blood. It was bright red blood that she was throwing up. it has stopepd now since she stopped drinking. \par \par When she lost mother and brother within a month. Mom had dementia and brother had lung cancer. Cat also . She got depressed. \par \par Does not take ibuprofen/NSAIDs. Only takes tylenol for her pain. \par \par No rashes, L eye has eye pain. She was given some drops until she follows up. (Given prednisone eye drops). She has likely uveitis. Is following with eye doctor now. Was told she has a lot of inflammation behind the eye. \par \par + joint pains - mostly her issues are in her legs and hands \par \par After Dr. Jerome asked her about it, she thought she had some ulcers/pimples in lower mouth but them gargled with listerine and went away. \par \par PMH: \par Asthma\par sarcoidosis \par anemia \par bleeding ulcers, gastritis \par sickle cell trait\par \par PSH: \par c sections\par \par FH: \par only cancer in family is kidney, breast, lung cancer \par \par SH:\par smokes cigarettes (down to 2 cigarettes a day, previously half a pack a day)\par Social EtOH (used to be a heavy drinker, stopped) \par Uses marijuana daily \par Previously illicit drug use but not now \par \par MEDS: \par albuterol\par iron pill 325mg TID (also gets iron infusions, once in hospital, going back this month, usually once a week)  \par prednisone \par pantoprazole 40mg BID, now daily \par \par ALL:  penicillin \par \par \par \par \par

## 2021-01-11 NOTE — REVIEW OF SYSTEMS
[Joint Pain] : joint pain [Limb Pain] : limb pain [Negative] : Heme/Lymph [Arthralgias] : no arthralgias [Joint Swelling] : no joint swelling [Joint Stiffness] : no joint stiffness [Limb Swelling] : no limb swelling

## 2021-01-11 NOTE — HISTORY OF PRESENT ILLNESS
[de-identified] : 49 yo F PMH asthma, sarcoidosis, anemia, bleeding ulcers/gastritis, sickle cell trait who presents after recent hospitalization for sarcoid flare. \juan f ba She notes she has been on prednisone on and off since sarcoidosis diagnosis. \juan f ba Was on 40mg 1 yr prior to recent hospitalization. Had not taken prednisone for 1 year prior to hospitalization though. Since being restarted on prednisone in the hospital, is feeling much better. \juan f ba 2020 - hospitalized because anemic. Per patient taking 20mg pred after discharged from hospital. Remains on 20mg prednisone now. Also having leg pain. Pain is always in her legs. Feels like "her bones hurt". Feels like someone is "trying to snatch bones out".  \juan f Had lost a lot of weight (went from 152 lbs to 124 lbs). Now weighs 140s. Has put some weight back on. \juan f Saw Dr. Jerome in follow up. \juan f ba When she returns to Dr. Jerome, plan for liver biopsy per patient.\juan f ba Is not already scheduled for a liver biopsy. \juan f ba Needs a colonoscopy as well. \juan f ba Has had a colonoscopy 3 yrs ago. Was done at [facility]. They didn't find anything. \juan f ba When she was in the hospital recommended to repeat colonoscopy. She is nervous to do prep because has made her throw up blood in the past. \juan f ba Had an EGD 6 yrs ago. She was told she had a lot of inflammation in the stomach. They also thought she had thyroid issues. \juan f ba Drinks a lot of spinach juice, beet juice - not constipated. \juan f ba Was seeing a lung doctor in the past. \juan f ba Has lots of heartburn, all the time, even time when drinking water. \juan f When taking twice a day, she felt better. now that she's on once a day, finds herself taking a pill at night. \juan f Gets nausea if takes too long to eat. \juan f ba Sometimes wakes up with leg pain at night (goes from hips down). Otherwise sometimes wakes up at night just randomly because sehc an't sleep. \juan f ba Gets muscle spasms in back after having a BM. \juan f ba Has Bms 3-4x/day. \par No blood in the stool (before the hospital did have blood in the stool). She was having dark black stools twice before went to the hospital, only happened twice. \par \par That's when she stopped drinking because she started throwing up blood. (she drinks occasionally now). It has been about 3-4 months since she stopped heavy drinking (stopped Sept/Oct). She got tired of being sick and throwing up blood. It was bright red blood that she was throwing up. it has stopepd now since she stopped drinking. \par \par When she lost mother and brother within a month. Mom had dementia and brother had lung cancer. Cat also . She got depressed. \par \par Does not take ibuprofen/NSAIDs. Only takes tylenol for her pain. \par \par No rashes, L eye has eye pain. She was given some drops until she follows up. (Given prednisone eye drops). She has likely uveitis. Is following with eye doctor now. Was told she has a lot of inflammation behind the eye. \par \par + joint pains - mostly her issues are in her legs and hands \par \par After Dr. Jerome asked her about it, she thought she had some ulcers/pimples in lower mouth but them gargled with listerine and went away. \par \par PMH: \par Asthma\par sarcoidosis \par anemia \par bleeding ulcers, gastritis \par sickle cell trait\par \par PSH: \par c sections\par \par FH: \par only cancer in family is kidney, breast, lung cancer \par \par SH:\par smokes cigarettes (down to 2 cigarettes a day, previously half a pack a day)\par Social EtOH (used to be a heavy drinker, stopped) \par Uses marijuana daily \par Previously illicit drug use but not now \par \par MEDS: \par albuterol\par iron pill 325mg TID (also gets iron infusions, once in hospital, going back this month, usually once a week)  \par prednisone \par pantoprazole 40mg BID, now daily \par \par ALL:  penicillin \par \par \par \par \par

## 2021-01-19 RX ORDER — HYDROXYZINE HYDROCHLORIDE 25 MG/1
25 TABLET ORAL
Qty: 120 | Refills: 0 | Status: ACTIVE | COMMUNITY
Start: 2021-01-19 | End: 1900-01-01

## 2021-03-03 ENCOUNTER — APPOINTMENT (OUTPATIENT)
Dept: GASTROENTEROLOGY | Facility: HOSPITAL | Age: 51
End: 2021-03-03

## 2021-03-18 ENCOUNTER — APPOINTMENT (OUTPATIENT)
Dept: GASTROENTEROLOGY | Facility: CLINIC | Age: 51
End: 2021-03-18

## 2021-05-27 NOTE — PROGRESS NOTE ADULT - PROBLEM/PLAN-6
Currently stable  Held PTA psych medication overnight on the night of admission given AMS and reports of Hallucinations  Restarted cymbalta, wellbutrin at previous doses on 5/25/21  Restarted trazodone at 25mg dose (usual dose is 50mg) on 5/25/21 
DISPLAY PLAN FREE TEXT
DISPLAY PLAN FREE TEXT

## 2022-01-25 LAB
25(OH)D3 SERPL-MCNC: 26.1 NG/ML
ALBUMIN SERPL ELPH-MCNC: 4.3 G/DL
ALP BLD-CCNC: 892 U/L
ALT SERPL-CCNC: 126 U/L
ANA PAT FLD IF-IMP: ABNORMAL
ANA SER IF-ACNC: ABNORMAL
ANION GAP SERPL CALC-SCNC: 12 MMOL/L
AST SERPL-CCNC: 84 U/L
BASOPHILS # BLD AUTO: 0.09 K/UL
BASOPHILS NFR BLD AUTO: 0.9 %
BILIRUB SERPL-MCNC: 1.1 MG/DL
BUN SERPL-MCNC: 30 MG/DL
CA-I SERPL-SCNC: 1.22 MMOL/L
CALCIUM SERPL-MCNC: 9.8 MG/DL
CERULOPLASMIN SERPL-MCNC: 53 MG/DL
CHLORIDE SERPL-SCNC: 103 MMOL/L
CO2 SERPL-SCNC: 23 MMOL/L
CREAT SERPL-MCNC: 1.34 MG/DL
EOSINOPHIL # BLD AUTO: 0.09 K/UL
EOSINOPHIL NFR BLD AUTO: 0.9 %
FERRITIN SERPL-MCNC: 14 NG/ML
FOLATE SERPL-MCNC: 10.9 NG/ML
GGT SERPL-CCNC: 917 U/L
GLUCOSE SERPL-MCNC: 92 MG/DL
HBV SURFACE AB SER QL: NONREACTIVE
HCT VFR BLD CALC: 38.3 %
HEPATITIS A IGG ANTIBODY: NONREACTIVE
HGB BLD-MCNC: 11 G/DL
IGG4 SER-MCNC: 30 MG/DL
IRON SATN MFR SERPL: 4 %
IRON SERPL-MCNC: 23 UG/DL
LKM AB SER QL IF: <20.1 UNITS
LYMPHOCYTES # BLD AUTO: 3.71 K/UL
LYMPHOCYTES NFR BLD AUTO: 35.8 %
MAN DIFF?: NORMAL
MCHC RBC-ENTMCNC: 24.9 PG
MCHC RBC-ENTMCNC: 28.7 GM/DL
MCV RBC AUTO: 86.7 FL
MITOCHONDRIA AB SER IF-ACNC: NORMAL
MONOCYTES # BLD AUTO: 1.05 K/UL
MONOCYTES NFR BLD AUTO: 10.1 %
NEUTROPHILS # BLD AUTO: 5.23 K/UL
NEUTROPHILS NFR BLD AUTO: 50.5 %
PLATELET # BLD AUTO: 169 K/UL
POTASSIUM SERPL-SCNC: 3.9 MMOL/L
PROT SERPL-MCNC: 8.2 G/DL
RBC # BLD: 4.42 M/UL
RBC # FLD: 23.5 %
SMOOTH MUSCLE AB SER QL IF: NORMAL
SODIUM SERPL-SCNC: 139 MMOL/L
TIBC SERPL-MCNC: 629 UG/DL
TSH RECEPTOR AB: <1.1 IU/L
UIBC SERPL-MCNC: 606 UG/DL
VIT B12 SERPL-MCNC: 634 PG/ML
WBC # FLD AUTO: 10.36 K/UL